# Patient Record
Sex: FEMALE | Race: OTHER | Employment: OTHER | URBAN - NONMETROPOLITAN AREA
[De-identification: names, ages, dates, MRNs, and addresses within clinical notes are randomized per-mention and may not be internally consistent; named-entity substitution may affect disease eponyms.]

---

## 2017-09-15 ENCOUNTER — HOSPITAL ENCOUNTER (EMERGENCY)
Age: 67
Discharge: HOME | End: 2017-09-15
Payer: MEDICARE

## 2017-09-15 VITALS
BODY MASS INDEX: 24.54 KG/M2 | DIASTOLIC BLOOD PRESSURE: 72 MMHG | WEIGHT: 125 LBS | RESPIRATION RATE: 14 BRPM | OXYGEN SATURATION: 95 % | HEIGHT: 60 IN | TEMPERATURE: 97.9 F | SYSTOLIC BLOOD PRESSURE: 149 MMHG | HEART RATE: 100 BPM

## 2017-09-15 DIAGNOSIS — R45.4 ANGER REACTION: Primary | ICD-10-CM

## 2017-09-15 PROCEDURE — 99285 EMERGENCY DEPT VISIT HI MDM: CPT

## 2017-09-15 RX ORDER — PREGABALIN 150 MG/1
300 CAPSULE ORAL EVERY MORNING
COMMUNITY

## 2017-09-15 ASSESSMENT — ENCOUNTER SYMPTOMS
VOMITING: 0
WHEEZING: 0
SORE THROAT: 0
SHORTNESS OF BREATH: 0
ABDOMINAL PAIN: 0
DIARRHEA: 0
BACK PAIN: 0
EYE PAIN: 0
EYE ITCHING: 0
RHINORRHEA: 0
NAUSEA: 0
COUGH: 0

## 2017-09-15 ASSESSMENT — PAIN SCALES - GENERAL: PAINLEVEL_OUTOF10: 5

## 2018-04-04 ENCOUNTER — HOSPITAL ENCOUNTER (OUTPATIENT)
Age: 68
Setting detail: SPECIMEN
Discharge: HOME | End: 2018-04-04
Payer: MEDICARE

## 2018-04-04 LAB
ABSOLUTE EOS #: 0.17 K/UL (ref 0–0.44)
ABSOLUTE IMMATURE GRANULOCYTE: <0.03 K/UL (ref 0–0.3)
ABSOLUTE LYMPH #: 2.12 K/UL (ref 1.1–3.7)
ABSOLUTE MONO #: 0.35 K/UL (ref 0.1–1.2)
ALBUMIN SERPL-MCNC: 3.7 G/DL (ref 3.5–5.2)
ALBUMIN/GLOBULIN RATIO: 1.3 (ref 1–2.5)
ALP BLD-CCNC: 70 U/L (ref 35–104)
ALT SERPL-CCNC: 17 U/L (ref 5–33)
ANION GAP SERPL CALCULATED.3IONS-SCNC: 8 MMOL/L (ref 9–17)
AST SERPL-CCNC: 19 U/L
BASOPHILS # BLD: 1 % (ref 0–2)
BASOPHILS ABSOLUTE: 0.04 K/UL (ref 0–0.2)
BILIRUB SERPL-MCNC: 0.19 MG/DL (ref 0.3–1.2)
BUN BLDV-MCNC: 49 MG/DL (ref 8–23)
BUN/CREAT BLD: ABNORMAL (ref 9–20)
CALCIUM SERPL-MCNC: 8.6 MG/DL (ref 8.6–10.4)
CHLORIDE BLD-SCNC: 113 MMOL/L (ref 98–107)
CHOLESTEROL/HDL RATIO: 3.2
CHOLESTEROL: 156 MG/DL
CO2: 23 MMOL/L (ref 20–31)
CREAT SERPL-MCNC: 1.17 MG/DL (ref 0.5–0.9)
DIFFERENTIAL TYPE: ABNORMAL
EOSINOPHILS RELATIVE PERCENT: 3 % (ref 1–4)
ESTIMATED AVERAGE GLUCOSE: 140 MG/DL
GFR AFRICAN AMERICAN: 55 ML/MIN
GFR NON-AFRICAN AMERICAN: 46 ML/MIN
GFR SERPL CREATININE-BSD FRML MDRD: ABNORMAL ML/MIN/{1.73_M2}
GFR SERPL CREATININE-BSD FRML MDRD: ABNORMAL ML/MIN/{1.73_M2}
GLUCOSE BLD-MCNC: 118 MG/DL (ref 70–99)
HBA1C MFR BLD: 6.5 % (ref 4–6)
HCT VFR BLD CALC: 29.9 % (ref 36.3–47.1)
HDLC SERPL-MCNC: 49 MG/DL
HEMOGLOBIN: 9.3 G/DL (ref 11.9–15.1)
IMMATURE GRANULOCYTES: 0 %
LDL CHOLESTEROL: 79 MG/DL (ref 0–130)
LYMPHOCYTES # BLD: 33 % (ref 24–43)
MCH RBC QN AUTO: 29.8 PG (ref 25.2–33.5)
MCHC RBC AUTO-ENTMCNC: 31.1 G/DL (ref 28.4–34.8)
MCV RBC AUTO: 95.8 FL (ref 82.6–102.9)
MONOCYTES # BLD: 5 % (ref 3–12)
NRBC AUTOMATED: 0 PER 100 WBC
PDW BLD-RTO: 14.6 % (ref 11.8–14.4)
PLATELET # BLD: 227 K/UL (ref 138–453)
PLATELET ESTIMATE: ABNORMAL
PMV BLD AUTO: 11.2 FL (ref 8.1–13.5)
POTASSIUM SERPL-SCNC: 5.4 MMOL/L (ref 3.7–5.3)
RBC # BLD: 3.12 M/UL (ref 3.95–5.11)
RBC # BLD: ABNORMAL 10*6/UL
SEG NEUTROPHILS: 58 % (ref 36–65)
SEGMENTED NEUTROPHILS ABSOLUTE COUNT: 3.75 K/UL (ref 1.5–8.1)
SODIUM BLD-SCNC: 144 MMOL/L (ref 135–144)
TOTAL PROTEIN: 6.6 G/DL (ref 6.4–8.3)
TRIGL SERPL-MCNC: 141 MG/DL
TSH SERPL DL<=0.05 MIU/L-ACNC: 2.24 MIU/L (ref 0.3–5)
VLDLC SERPL CALC-MCNC: NORMAL MG/DL (ref 1–30)
WBC # BLD: 6.4 K/UL (ref 3.5–11.3)
WBC # BLD: ABNORMAL 10*3/UL

## 2019-10-23 ENCOUNTER — HOSPITAL ENCOUNTER (INPATIENT)
Dept: HOSPITAL 90 - EDH | Age: 69
LOS: 6 days | Discharge: NURSING, REHAB OR LONG-TERM CARE | DRG: 871 | End: 2019-10-29
Attending: INTERNAL MEDICINE | Admitting: INTERNAL MEDICINE
Payer: MEDICARE

## 2019-10-23 VITALS — HEIGHT: 62 IN | BODY MASS INDEX: 20.91 KG/M2 | WEIGHT: 113.6 LBS

## 2019-10-23 VITALS — DIASTOLIC BLOOD PRESSURE: 43 MMHG | SYSTOLIC BLOOD PRESSURE: 96 MMHG

## 2019-10-23 DIAGNOSIS — Z79.899: ICD-10-CM

## 2019-10-23 DIAGNOSIS — E86.1: ICD-10-CM

## 2019-10-23 DIAGNOSIS — N17.9: ICD-10-CM

## 2019-10-23 DIAGNOSIS — N18.6: ICD-10-CM

## 2019-10-23 DIAGNOSIS — I12.0: ICD-10-CM

## 2019-10-23 DIAGNOSIS — Z91.81: ICD-10-CM

## 2019-10-23 DIAGNOSIS — M62.82: ICD-10-CM

## 2019-10-23 DIAGNOSIS — E11.22: ICD-10-CM

## 2019-10-23 DIAGNOSIS — J44.9: ICD-10-CM

## 2019-10-23 DIAGNOSIS — E11.10: ICD-10-CM

## 2019-10-23 DIAGNOSIS — G93.41: ICD-10-CM

## 2019-10-23 DIAGNOSIS — R65.20: ICD-10-CM

## 2019-10-23 DIAGNOSIS — Z96.41: ICD-10-CM

## 2019-10-23 DIAGNOSIS — E87.1: ICD-10-CM

## 2019-10-23 DIAGNOSIS — N39.0: ICD-10-CM

## 2019-10-23 DIAGNOSIS — B02.9: ICD-10-CM

## 2019-10-23 DIAGNOSIS — I95.9: ICD-10-CM

## 2019-10-23 DIAGNOSIS — E87.5: ICD-10-CM

## 2019-10-23 DIAGNOSIS — A41.9: Primary | ICD-10-CM

## 2019-10-23 DIAGNOSIS — G93.49: ICD-10-CM

## 2019-10-23 DIAGNOSIS — Z79.4: ICD-10-CM

## 2019-10-23 LAB
ALBUMIN SERPL-MCNC: 3.1 G/DL (ref 3.5–5)
ALT SERPL-CCNC: 9 U/L (ref 12–78)
APPEARANCE UR: (no result)
APTT PPP: 23.6 SEC (ref 26.3–35.5)
AST SERPL-CCNC: 13 U/L (ref 10–37)
BASE EXCESS BLDA CALC-SCNC: -13.7 MMOL/L (ref -2–3)
BASE EXCESS BLDA CALC-SCNC: -16.3 MMOL/L (ref -2–3)
BASOPHILS NFR BLD AUTO: 0.3 % (ref 0–5)
BILIRUB SERPL-MCNC: 0.6 MG/DL (ref 0.2–1)
BILIRUB UR QL STRIP: NEGATIVE
BUN SERPL-MCNC: 70 MG/DL (ref 7–18)
BUN SERPL-MCNC: 77 MG/DL (ref 7–18)
CHLORIDE SERPL-SCNC: 84 MMOL/L (ref 101–111)
CHLORIDE SERPL-SCNC: 97 MMOL/L (ref 101–111)
CK SERPL-CCNC: 182 U/L (ref 21–232)
CO2 SERPL-SCNC: 11 MMOL/L (ref 21–32)
CO2 SERPL-SCNC: 13 MMOL/L (ref 21–32)
COLOR UR: YELLOW
CREAT SERPL-MCNC: 3.4 MG/DL (ref 0.5–1.5)
CREAT SERPL-MCNC: 4 MG/DL (ref 0.5–1.5)
DEPRECATED SQUAMOUS URNS QL MICRO: (no result) /HPF (ref 0–2)
EOSINOPHIL NFR BLD AUTO: 0 % (ref 0–8)
ERYTHROCYTE [DISTWIDTH] IN BLOOD BY AUTOMATED COUNT: 13.5 % (ref 11–15.5)
GFR SERPL CREATININE-BSD FRML MDRD: 12 ML/MIN (ref 60–?)
GFR SERPL CREATININE-BSD FRML MDRD: 14 ML/MIN (ref 60–?)
GLUCOSE SERPL-MCNC: 446 MG/DL (ref 70–105)
GLUCOSE SERPL-MCNC: 826 MG/DL (ref 70–105)
GLUCOSE UR STRIP-MCNC: >=1000 MG/DL
HCO3 BLDA-SCNC: 10.1 MMOL/L (ref 21–28)
HCO3 BLDA-SCNC: 8.5 MMOL/L (ref 21–28)
HCT VFR BLD AUTO: 34.2 % (ref 36–48)
HGB UR QL STRIP: (no result)
INR PPP: 0.93 (ref 0.85–1.15)
KETONES UR STRIP-MCNC: 40 MG/DL
LEUKOCYTE ESTERASE UR QL STRIP: (no result)
LYMPHOCYTES NFR SPEC AUTO: 4.5 % (ref 21–51)
MAGNESIUM SERPL-MCNC: 2.7 MG/DL (ref 1.8–2.4)
MCH RBC QN AUTO: 30.9 PG (ref 27–33)
MCHC RBC AUTO-ENTMCNC: 32.5 G/DL (ref 32–36)
MCV RBC AUTO: 95.2 FL (ref 79–99)
MONOCYTES NFR BLD AUTO: 4.7 % (ref 3–13)
MYOGLOBIN SERPL-MCNC: 332 NG/ML (ref 10–92)
NEUTROPHILS NFR BLD AUTO: 90.5 % (ref 40–77)
NITRITE UR QL STRIP: NEGATIVE
NRBC BLD MANUAL-RTO: 0 % (ref 0–0.19)
PCO2 BLDA: 20 MMHG (ref 32–45)
PCO2 BLDA: 21 MMHG (ref 32–45)
PH UR STRIP: 5 [PH] (ref 5–8)
PLATELET # BLD AUTO: 274 K/UL (ref 130–400)
POTASSIUM SERPL-SCNC: 4.4 MMOL/L (ref 3.5–5.1)
POTASSIUM SERPL-SCNC: 5.8 MMOL/L (ref 3.5–5.1)
PROT SERPL-MCNC: 7.8 G/DL (ref 6–8.3)
PROT UR QL STRIP: 30 MG/DL
PROTHROMBIN TIME: 9.8 SEC (ref 9.6–11.6)
RBC # BLD AUTO: 3.59 MIL/UL (ref 4–5.5)
RBC #/AREA URNS HPF: (no result) /HPF (ref 0–1)
SAO2 % BLDA: 94.4 % (ref 95–99)
SAO2 % BLDA: 97.9 % (ref 95–99)
SODIUM SERPL-SCNC: 123 MMOL/L (ref 136–145)
SODIUM SERPL-SCNC: 133 MMOL/L (ref 136–145)
SP GR UR STRIP: 1.02 (ref 1–1.03)
TROPONIN I SERPL-MCNC: < 0.04 NG/ML (ref 0–0.06)
UROBILINOGEN UR STRIP-MCNC: 0.2 MG/DL (ref 0.2–1)
WBC # BLD AUTO: 15.8 K/UL (ref 4.8–10.8)
WBC #/AREA URNS HPF: (no result) /HPF (ref 0–1)

## 2019-10-23 PROCEDURE — 87040 BLOOD CULTURE FOR BACTERIA: CPT

## 2019-10-23 PROCEDURE — 83874 ASSAY OF MYOGLOBIN: CPT

## 2019-10-23 PROCEDURE — 84100 ASSAY OF PHOSPHORUS: CPT

## 2019-10-23 PROCEDURE — 97039 UNLISTED MODALITY: CPT

## 2019-10-23 PROCEDURE — 82948 REAGENT STRIP/BLOOD GLUCOSE: CPT

## 2019-10-23 PROCEDURE — 84132 ASSAY OF SERUM POTASSIUM: CPT

## 2019-10-23 PROCEDURE — 83605 ASSAY OF LACTIC ACID: CPT

## 2019-10-23 PROCEDURE — 82803 BLOOD GASES ANY COMBINATION: CPT

## 2019-10-23 PROCEDURE — 82010 KETONE BODYS QUAN: CPT

## 2019-10-23 PROCEDURE — 87088 URINE BACTERIA CULTURE: CPT

## 2019-10-23 PROCEDURE — 36415 COLL VENOUS BLD VENIPUNCTURE: CPT

## 2019-10-23 PROCEDURE — 93005 ELECTROCARDIOGRAM TRACING: CPT

## 2019-10-23 PROCEDURE — 85610 PROTHROMBIN TIME: CPT

## 2019-10-23 PROCEDURE — 83036 HEMOGLOBIN GLYCOSYLATED A1C: CPT

## 2019-10-23 PROCEDURE — 81001 URINALYSIS AUTO W/SCOPE: CPT

## 2019-10-23 PROCEDURE — 85027 COMPLETE CBC AUTOMATED: CPT

## 2019-10-23 PROCEDURE — 84145 PROCALCITONIN (PCT): CPT

## 2019-10-23 PROCEDURE — 85025 COMPLETE CBC W/AUTO DIFF WBC: CPT

## 2019-10-23 PROCEDURE — 99291 CRITICAL CARE FIRST HOUR: CPT

## 2019-10-23 PROCEDURE — 36600 WITHDRAWAL OF ARTERIAL BLOOD: CPT

## 2019-10-23 PROCEDURE — 85730 THROMBOPLASTIN TIME PARTIAL: CPT

## 2019-10-23 PROCEDURE — 80053 COMPREHEN METABOLIC PANEL: CPT

## 2019-10-23 PROCEDURE — 71045 X-RAY EXAM CHEST 1 VIEW: CPT

## 2019-10-23 PROCEDURE — 83735 ASSAY OF MAGNESIUM: CPT

## 2019-10-23 PROCEDURE — 82550 ASSAY OF CK (CPK): CPT

## 2019-10-23 PROCEDURE — 80048 BASIC METABOLIC PNL TOTAL CA: CPT

## 2019-10-23 PROCEDURE — 84443 ASSAY THYROID STIM HORMONE: CPT

## 2019-10-23 PROCEDURE — 84484 ASSAY OF TROPONIN QUANT: CPT

## 2019-10-23 RX ADMIN — SODIUM CHLORIDE SCH GM: 9 INJECTION, SOLUTION INTRAVENOUS at 19:45

## 2019-10-23 RX ADMIN — SODIUM CHLORIDE SCH MLS/HR: 0.9 INJECTION, SOLUTION INTRAVENOUS at 19:06

## 2019-10-24 VITALS — DIASTOLIC BLOOD PRESSURE: 52 MMHG | SYSTOLIC BLOOD PRESSURE: 112 MMHG

## 2019-10-24 VITALS — DIASTOLIC BLOOD PRESSURE: 41 MMHG | SYSTOLIC BLOOD PRESSURE: 116 MMHG

## 2019-10-24 VITALS — DIASTOLIC BLOOD PRESSURE: 50 MMHG | SYSTOLIC BLOOD PRESSURE: 113 MMHG

## 2019-10-24 VITALS — DIASTOLIC BLOOD PRESSURE: 44 MMHG | SYSTOLIC BLOOD PRESSURE: 87 MMHG

## 2019-10-24 VITALS — SYSTOLIC BLOOD PRESSURE: 103 MMHG | DIASTOLIC BLOOD PRESSURE: 51 MMHG

## 2019-10-24 VITALS — SYSTOLIC BLOOD PRESSURE: 113 MMHG | DIASTOLIC BLOOD PRESSURE: 52 MMHG

## 2019-10-24 VITALS — SYSTOLIC BLOOD PRESSURE: 108 MMHG | DIASTOLIC BLOOD PRESSURE: 61 MMHG

## 2019-10-24 VITALS — SYSTOLIC BLOOD PRESSURE: 97 MMHG | DIASTOLIC BLOOD PRESSURE: 52 MMHG

## 2019-10-24 VITALS — SYSTOLIC BLOOD PRESSURE: 110 MMHG | DIASTOLIC BLOOD PRESSURE: 50 MMHG

## 2019-10-24 VITALS — DIASTOLIC BLOOD PRESSURE: 55 MMHG | SYSTOLIC BLOOD PRESSURE: 124 MMHG

## 2019-10-24 VITALS — DIASTOLIC BLOOD PRESSURE: 51 MMHG | SYSTOLIC BLOOD PRESSURE: 102 MMHG

## 2019-10-24 VITALS — DIASTOLIC BLOOD PRESSURE: 55 MMHG | SYSTOLIC BLOOD PRESSURE: 111 MMHG

## 2019-10-24 VITALS — DIASTOLIC BLOOD PRESSURE: 52 MMHG | SYSTOLIC BLOOD PRESSURE: 109 MMHG

## 2019-10-24 VITALS — DIASTOLIC BLOOD PRESSURE: 50 MMHG | SYSTOLIC BLOOD PRESSURE: 104 MMHG

## 2019-10-24 VITALS — SYSTOLIC BLOOD PRESSURE: 120 MMHG | DIASTOLIC BLOOD PRESSURE: 56 MMHG

## 2019-10-24 VITALS — SYSTOLIC BLOOD PRESSURE: 110 MMHG | DIASTOLIC BLOOD PRESSURE: 48 MMHG

## 2019-10-24 VITALS — SYSTOLIC BLOOD PRESSURE: 95 MMHG | DIASTOLIC BLOOD PRESSURE: 41 MMHG

## 2019-10-24 VITALS — SYSTOLIC BLOOD PRESSURE: 104 MMHG | DIASTOLIC BLOOD PRESSURE: 51 MMHG

## 2019-10-24 VITALS — SYSTOLIC BLOOD PRESSURE: 121 MMHG | DIASTOLIC BLOOD PRESSURE: 57 MMHG

## 2019-10-24 VITALS — SYSTOLIC BLOOD PRESSURE: 112 MMHG | DIASTOLIC BLOOD PRESSURE: 50 MMHG

## 2019-10-24 VITALS — SYSTOLIC BLOOD PRESSURE: 113 MMHG | DIASTOLIC BLOOD PRESSURE: 42 MMHG

## 2019-10-24 VITALS — DIASTOLIC BLOOD PRESSURE: 58 MMHG | SYSTOLIC BLOOD PRESSURE: 98 MMHG

## 2019-10-24 VITALS — SYSTOLIC BLOOD PRESSURE: 103 MMHG | DIASTOLIC BLOOD PRESSURE: 53 MMHG

## 2019-10-24 VITALS — SYSTOLIC BLOOD PRESSURE: 89 MMHG | DIASTOLIC BLOOD PRESSURE: 49 MMHG

## 2019-10-24 VITALS — DIASTOLIC BLOOD PRESSURE: 46 MMHG | SYSTOLIC BLOOD PRESSURE: 119 MMHG

## 2019-10-24 VITALS — SYSTOLIC BLOOD PRESSURE: 108 MMHG | DIASTOLIC BLOOD PRESSURE: 49 MMHG

## 2019-10-24 VITALS — SYSTOLIC BLOOD PRESSURE: 108 MMHG | DIASTOLIC BLOOD PRESSURE: 53 MMHG

## 2019-10-24 VITALS — SYSTOLIC BLOOD PRESSURE: 94 MMHG | DIASTOLIC BLOOD PRESSURE: 48 MMHG

## 2019-10-24 VITALS — SYSTOLIC BLOOD PRESSURE: 106 MMHG | DIASTOLIC BLOOD PRESSURE: 56 MMHG

## 2019-10-24 VITALS — SYSTOLIC BLOOD PRESSURE: 100 MMHG | DIASTOLIC BLOOD PRESSURE: 52 MMHG

## 2019-10-24 VITALS — SYSTOLIC BLOOD PRESSURE: 103 MMHG | DIASTOLIC BLOOD PRESSURE: 55 MMHG

## 2019-10-24 VITALS — DIASTOLIC BLOOD PRESSURE: 56 MMHG | SYSTOLIC BLOOD PRESSURE: 97 MMHG

## 2019-10-24 VITALS — DIASTOLIC BLOOD PRESSURE: 41 MMHG | SYSTOLIC BLOOD PRESSURE: 107 MMHG

## 2019-10-24 VITALS — SYSTOLIC BLOOD PRESSURE: 122 MMHG | DIASTOLIC BLOOD PRESSURE: 51 MMHG

## 2019-10-24 VITALS — SYSTOLIC BLOOD PRESSURE: 114 MMHG | DIASTOLIC BLOOD PRESSURE: 58 MMHG

## 2019-10-24 VITALS — SYSTOLIC BLOOD PRESSURE: 95 MMHG | DIASTOLIC BLOOD PRESSURE: 46 MMHG

## 2019-10-24 VITALS — DIASTOLIC BLOOD PRESSURE: 49 MMHG | SYSTOLIC BLOOD PRESSURE: 91 MMHG

## 2019-10-24 VITALS — SYSTOLIC BLOOD PRESSURE: 117 MMHG | DIASTOLIC BLOOD PRESSURE: 50 MMHG

## 2019-10-24 VITALS — SYSTOLIC BLOOD PRESSURE: 119 MMHG | DIASTOLIC BLOOD PRESSURE: 53 MMHG

## 2019-10-24 VITALS — SYSTOLIC BLOOD PRESSURE: 98 MMHG | DIASTOLIC BLOOD PRESSURE: 54 MMHG

## 2019-10-24 VITALS — SYSTOLIC BLOOD PRESSURE: 96 MMHG | DIASTOLIC BLOOD PRESSURE: 56 MMHG

## 2019-10-24 VITALS — SYSTOLIC BLOOD PRESSURE: 90 MMHG | DIASTOLIC BLOOD PRESSURE: 41 MMHG

## 2019-10-24 VITALS — DIASTOLIC BLOOD PRESSURE: 50 MMHG | SYSTOLIC BLOOD PRESSURE: 108 MMHG

## 2019-10-24 VITALS — SYSTOLIC BLOOD PRESSURE: 85 MMHG | DIASTOLIC BLOOD PRESSURE: 49 MMHG

## 2019-10-24 VITALS — SYSTOLIC BLOOD PRESSURE: 94 MMHG | DIASTOLIC BLOOD PRESSURE: 49 MMHG

## 2019-10-24 VITALS — DIASTOLIC BLOOD PRESSURE: 45 MMHG | SYSTOLIC BLOOD PRESSURE: 105 MMHG

## 2019-10-24 VITALS — DIASTOLIC BLOOD PRESSURE: 51 MMHG | SYSTOLIC BLOOD PRESSURE: 98 MMHG

## 2019-10-24 VITALS — DIASTOLIC BLOOD PRESSURE: 51 MMHG | SYSTOLIC BLOOD PRESSURE: 100 MMHG

## 2019-10-24 VITALS — SYSTOLIC BLOOD PRESSURE: 106 MMHG | DIASTOLIC BLOOD PRESSURE: 54 MMHG

## 2019-10-24 VITALS — SYSTOLIC BLOOD PRESSURE: 113 MMHG | DIASTOLIC BLOOD PRESSURE: 50 MMHG

## 2019-10-24 VITALS — SYSTOLIC BLOOD PRESSURE: 105 MMHG | DIASTOLIC BLOOD PRESSURE: 53 MMHG

## 2019-10-24 VITALS — DIASTOLIC BLOOD PRESSURE: 56 MMHG | SYSTOLIC BLOOD PRESSURE: 100 MMHG

## 2019-10-24 VITALS — SYSTOLIC BLOOD PRESSURE: 107 MMHG | DIASTOLIC BLOOD PRESSURE: 53 MMHG

## 2019-10-24 VITALS — DIASTOLIC BLOOD PRESSURE: 57 MMHG | SYSTOLIC BLOOD PRESSURE: 114 MMHG

## 2019-10-24 VITALS — SYSTOLIC BLOOD PRESSURE: 104 MMHG | DIASTOLIC BLOOD PRESSURE: 48 MMHG

## 2019-10-24 LAB
BASE EXCESS BLDA CALC-SCNC: -6 MMOL/L (ref -2–3)
BASE EXCESS BLDA CALC-SCNC: -9.2 MMOL/L (ref -2–3)
BUN SERPL-MCNC: 56 MG/DL (ref 7–18)
BUN SERPL-MCNC: 59 MG/DL (ref 7–18)
CHLORIDE SERPL-SCNC: 103 MMOL/L (ref 101–111)
CHLORIDE SERPL-SCNC: 107 MMOL/L (ref 101–111)
CO2 SERPL-SCNC: 21 MMOL/L (ref 21–32)
CO2 SERPL-SCNC: 22 MMOL/L (ref 21–32)
CREAT SERPL-MCNC: 2.5 MG/DL (ref 0.5–1.5)
CREAT SERPL-MCNC: 2.8 MG/DL (ref 0.5–1.5)
GFR SERPL CREATININE-BSD FRML MDRD: 18 ML/MIN (ref 60–?)
GFR SERPL CREATININE-BSD FRML MDRD: 20 ML/MIN (ref 60–?)
GLUCOSE SERPL-MCNC: 113 MG/DL (ref 70–105)
GLUCOSE SERPL-MCNC: 158 MG/DL (ref 70–105)
HCO3 BLDA-SCNC: 15.8 MMOL/L (ref 21–28)
HCO3 BLDA-SCNC: 18.1 MMOL/L (ref 21–28)
MAGNESIUM SERPL-MCNC: 2.4 MG/DL (ref 1.8–2.4)
MAGNESIUM SERPL-MCNC: 2.5 MG/DL (ref 1.8–2.4)
PCO2 BLDA: 32 MMHG (ref 32–45)
PCO2 BLDA: 33 MMHG (ref 32–45)
POTASSIUM SERPL-SCNC: 3.4 MMOL/L (ref 3.5–5.1)
POTASSIUM SERPL-SCNC: 3.6 MMOL/L (ref 3.5–5.1)
SAO2 % BLDA: 96.1 % (ref 95–99)
SAO2 % BLDA: 97.5 % (ref 95–99)
SODIUM SERPL-SCNC: 136 MMOL/L (ref 136–145)
SODIUM SERPL-SCNC: 138 MMOL/L (ref 136–145)

## 2019-10-24 RX ADMIN — DEXTROSE AND SODIUM CHLORIDE PRN MLS/HR: 5; .45 INJECTION, SOLUTION INTRAVENOUS at 01:42

## 2019-10-24 RX ADMIN — FLUCONAZOLE IN SODIUM CHLORIDE SCH MLS/HR: 2 INJECTION, SOLUTION INTRAVENOUS at 07:58

## 2019-10-24 RX ADMIN — ENOXAPARIN SODIUM SCH MG: 30 INJECTION SUBCUTANEOUS at 20:48

## 2019-10-24 RX ADMIN — SODIUM CHLORIDE SCH MLS/HR: 0.9 INJECTION, SOLUTION INTRAVENOUS at 20:47

## 2019-10-24 RX ADMIN — SODIUM CHLORIDE SCH GM: 9 INJECTION, SOLUTION INTRAVENOUS at 07:58

## 2019-10-24 RX ADMIN — INSULIN GLARGINE SCH UNITS: 100 INJECTION, SOLUTION SUBCUTANEOUS at 20:48

## 2019-10-24 RX ADMIN — SODIUM CHLORIDE SCH MLS/HR: 0.9 INJECTION, SOLUTION INTRAVENOUS at 12:44

## 2019-10-24 RX ADMIN — SODIUM CHLORIDE SCH MLS/HR: 0.9 INJECTION, SOLUTION INTRAVENOUS at 07:59

## 2019-10-24 RX ADMIN — FAMOTIDINE SCH MG: 10 INJECTION INTRAVENOUS at 07:58

## 2019-10-24 RX ADMIN — INSULIN LISPRO SCH UNIT: 100 INJECTION, SOLUTION INTRAVENOUS; SUBCUTANEOUS at 17:28

## 2019-10-24 RX ADMIN — DEXTROSE AND SODIUM CHLORIDE PRN MLS/HR: 5; .45 INJECTION, SOLUTION INTRAVENOUS at 11:00

## 2019-10-24 RX ADMIN — SODIUM CHLORIDE SCH MLS/HR: 0.9 INJECTION, SOLUTION INTRAVENOUS at 07:45

## 2019-10-24 RX ADMIN — SODIUM CHLORIDE SCH GM: 9 INJECTION, SOLUTION INTRAVENOUS at 20:47

## 2019-10-24 RX ADMIN — INSULIN LISPRO SCH UNIT: 100 INJECTION, SOLUTION INTRAVENOUS; SUBCUTANEOUS at 12:55

## 2019-10-24 RX ADMIN — SODIUM CHLORIDE SCH MLS/HR: 0.9 INJECTION, SOLUTION INTRAVENOUS at 14:39

## 2019-10-24 NOTE — NUR
DC PLAN

AS PER PATIENT, LIVES WITH DAUGHTER AND GRANDCHILD AND IS INDEPENDENT. NO PROVIDER SERVICES, 
HAS WALKER AND CANE. AS PER PATIENT, FEELS SAFE TO RETURN HOME.

-------------------------------------------------------------------------------

Addendum: 10/24/19 at 1559 by VIRAJ HICKMAN

-------------------------------------------------------------------------------

Amended: Links added.

## 2019-10-24 NOTE — NUR
DR. GARCIA IN ROOM ASSESSING/SPEAKING WITH PT. RE:PLAN OF CARE. QUESTIONS ANSWERED BY 
DR. GARCIA, PT. VERBALIZED UNDERSTANDING.

## 2019-10-25 VITALS — DIASTOLIC BLOOD PRESSURE: 62 MMHG | SYSTOLIC BLOOD PRESSURE: 137 MMHG

## 2019-10-25 VITALS — DIASTOLIC BLOOD PRESSURE: 59 MMHG | SYSTOLIC BLOOD PRESSURE: 126 MMHG

## 2019-10-25 VITALS — DIASTOLIC BLOOD PRESSURE: 52 MMHG | SYSTOLIC BLOOD PRESSURE: 127 MMHG

## 2019-10-25 VITALS — DIASTOLIC BLOOD PRESSURE: 62 MMHG | SYSTOLIC BLOOD PRESSURE: 130 MMHG

## 2019-10-25 VITALS — SYSTOLIC BLOOD PRESSURE: 150 MMHG | DIASTOLIC BLOOD PRESSURE: 75 MMHG

## 2019-10-25 VITALS — DIASTOLIC BLOOD PRESSURE: 65 MMHG | SYSTOLIC BLOOD PRESSURE: 149 MMHG

## 2019-10-25 VITALS — SYSTOLIC BLOOD PRESSURE: 134 MMHG | DIASTOLIC BLOOD PRESSURE: 58 MMHG

## 2019-10-25 VITALS — SYSTOLIC BLOOD PRESSURE: 152 MMHG | DIASTOLIC BLOOD PRESSURE: 73 MMHG

## 2019-10-25 VITALS — SYSTOLIC BLOOD PRESSURE: 145 MMHG | DIASTOLIC BLOOD PRESSURE: 74 MMHG

## 2019-10-25 LAB
BUN SERPL-MCNC: 36 MG/DL (ref 7–18)
CHLORIDE SERPL-SCNC: 109 MMOL/L (ref 101–111)
CK SERPL-CCNC: 36 U/L (ref 21–232)
CO2 SERPL-SCNC: 18 MMOL/L (ref 21–32)
CREAT SERPL-MCNC: 1.6 MG/DL (ref 0.5–1.5)
ERYTHROCYTE [DISTWIDTH] IN BLOOD BY AUTOMATED COUNT: 13.4 % (ref 11–15.5)
GFR SERPL CREATININE-BSD FRML MDRD: 34 ML/MIN (ref 60–?)
GLUCOSE SERPL-MCNC: 204 MG/DL (ref 70–105)
HBA1C MFR BLD: 12.7 % (ref 4–6)
HCT VFR BLD AUTO: 26.6 % (ref 36–48)
MAGNESIUM SERPL-MCNC: 2.1 MG/DL (ref 1.8–2.4)
MCH RBC QN AUTO: 31.3 PG (ref 27–33)
MCHC RBC AUTO-ENTMCNC: 33.9 G/DL (ref 32–36)
MCV RBC AUTO: 92.4 FL (ref 79–99)
MYOGLOBIN SERPL-MCNC: 72 NG/ML (ref 10–92)
NRBC BLD MANUAL-RTO: 0 % (ref 0–0.19)
PHOSPHATE SERPL-MCNC: 2.8 MG/DL (ref 2.5–4.9)
PLATELET # BLD AUTO: 199 K/UL (ref 130–400)
POTASSIUM SERPL-SCNC: 3.5 MMOL/L (ref 3.5–5.1)
RBC # BLD AUTO: 2.88 MIL/UL (ref 4–5.5)
SODIUM SERPL-SCNC: 139 MMOL/L (ref 136–145)
TSH SERPL DL<=0.05 MIU/L-ACNC: 0.71 UIU/ML (ref 0.36–3.74)
WBC # BLD AUTO: 8.3 K/UL (ref 4.8–10.8)

## 2019-10-25 RX ADMIN — INSULIN GLARGINE SCH UNITS: 100 INJECTION, SOLUTION SUBCUTANEOUS at 21:20

## 2019-10-25 RX ADMIN — NIFEDIPINE SCH MG: 30 TABLET, FILM COATED, EXTENDED RELEASE ORAL at 11:31

## 2019-10-25 RX ADMIN — Medication SCH MG: at 21:19

## 2019-10-25 RX ADMIN — Medication SCH MG: at 22:01

## 2019-10-25 RX ADMIN — SODIUM CHLORIDE SCH MLS/HR: 0.9 INJECTION, SOLUTION INTRAVENOUS at 11:06

## 2019-10-25 RX ADMIN — FAMOTIDINE SCH MG: 10 INJECTION INTRAVENOUS at 08:35

## 2019-10-25 RX ADMIN — DOCUSATE SODIUM SCH MG: 100 TABLET, FILM COATED ORAL at 21:19

## 2019-10-25 RX ADMIN — SODIUM CHLORIDE SCH MLS/HR: 0.9 INJECTION, SOLUTION INTRAVENOUS at 23:48

## 2019-10-25 RX ADMIN — ENOXAPARIN SODIUM SCH MG: 30 INJECTION SUBCUTANEOUS at 08:34

## 2019-10-25 RX ADMIN — PREGABALIN SCH MG: 75 CAPSULE ORAL at 11:32

## 2019-10-25 RX ADMIN — INSULIN LISPRO SCH UNIT: 100 INJECTION, SOLUTION INTRAVENOUS; SUBCUTANEOUS at 11:30

## 2019-10-25 RX ADMIN — SODIUM CHLORIDE SCH MLS/HR: 0.9 INJECTION, SOLUTION INTRAVENOUS at 06:12

## 2019-10-25 RX ADMIN — INSULIN LISPRO SCH UNIT: 100 INJECTION, SOLUTION INTRAVENOUS; SUBCUTANEOUS at 06:12

## 2019-10-25 RX ADMIN — SODIUM CHLORIDE SCH MLS/HR: 0.9 INJECTION, SOLUTION INTRAVENOUS at 16:07

## 2019-10-25 RX ADMIN — SODIUM CHLORIDE SCH GM: 9 INJECTION, SOLUTION INTRAVENOUS at 16:20

## 2019-10-25 RX ADMIN — INSULIN LISPRO SCH UNIT: 100 INJECTION, SOLUTION INTRAVENOUS; SUBCUTANEOUS at 16:27

## 2019-10-25 RX ADMIN — SODIUM CHLORIDE SCH GM: 9 INJECTION, SOLUTION INTRAVENOUS at 08:33

## 2019-10-25 RX ADMIN — FLUCONAZOLE IN SODIUM CHLORIDE SCH MLS/HR: 2 INJECTION, SOLUTION INTRAVENOUS at 08:34

## 2019-10-25 NOTE — NUR
Report given to Pricila CALI at about 1150. Patient transferred to room 313 from icu room 209 
at 1220. All belonging went with the patient.

## 2019-10-25 NOTE — NUR
Dr. Price at bedside rounding and reviewed plan of care for the day. Ordered lactulose 
1 time dose to be given

## 2019-10-26 VITALS — SYSTOLIC BLOOD PRESSURE: 140 MMHG | DIASTOLIC BLOOD PRESSURE: 71 MMHG

## 2019-10-26 VITALS — SYSTOLIC BLOOD PRESSURE: 137 MMHG | DIASTOLIC BLOOD PRESSURE: 80 MMHG

## 2019-10-26 VITALS — SYSTOLIC BLOOD PRESSURE: 152 MMHG | DIASTOLIC BLOOD PRESSURE: 76 MMHG

## 2019-10-26 VITALS — SYSTOLIC BLOOD PRESSURE: 122 MMHG | DIASTOLIC BLOOD PRESSURE: 50 MMHG

## 2019-10-26 VITALS — DIASTOLIC BLOOD PRESSURE: 85 MMHG | SYSTOLIC BLOOD PRESSURE: 162 MMHG

## 2019-10-26 LAB
BASOPHILS NFR BLD AUTO: 0.7 % (ref 0–5)
BUN SERPL-MCNC: 15 MG/DL (ref 7–18)
CHLORIDE SERPL-SCNC: 106 MMOL/L (ref 101–111)
CO2 SERPL-SCNC: 23 MMOL/L (ref 21–32)
CREAT SERPL-MCNC: 0.9 MG/DL (ref 0.5–1.5)
EOSINOPHIL NFR BLD AUTO: 0.9 % (ref 0–8)
ERYTHROCYTE [DISTWIDTH] IN BLOOD BY AUTOMATED COUNT: 13 % (ref 11–15.5)
GFR SERPL CREATININE-BSD FRML MDRD: 66 ML/MIN (ref 60–?)
GLUCOSE SERPL-MCNC: 155 MG/DL (ref 70–105)
HCT VFR BLD AUTO: 31.7 % (ref 36–48)
LYMPHOCYTES NFR SPEC AUTO: 17.3 % (ref 21–51)
MAGNESIUM SERPL-MCNC: 1.4 MG/DL (ref 1.8–2.4)
MCH RBC QN AUTO: 31.2 PG (ref 27–33)
MCHC RBC AUTO-ENTMCNC: 33.8 G/DL (ref 32–36)
MCV RBC AUTO: 92.1 FL (ref 79–99)
MONOCYTES NFR BLD AUTO: 9.3 % (ref 3–13)
NEUTROPHILS NFR BLD AUTO: 71.8 % (ref 40–77)
NRBC BLD MANUAL-RTO: 0 % (ref 0–0.19)
PHOSPHATE SERPL-MCNC: 2.6 MG/DL (ref 2.5–4.9)
PLATELET # BLD AUTO: 218 K/UL (ref 130–400)
POTASSIUM SERPL-SCNC: 2.8 MMOL/L (ref 3.5–5.1)
RBC # BLD AUTO: 3.45 MIL/UL (ref 4–5.5)
SODIUM SERPL-SCNC: 143 MMOL/L (ref 136–145)
WBC # BLD AUTO: 6.7 K/UL (ref 4.8–10.8)

## 2019-10-26 RX ADMIN — DOCUSATE SODIUM SCH MG: 100 TABLET, FILM COATED ORAL at 19:45

## 2019-10-26 RX ADMIN — POTASSIUM CHLORIDE PRN MEQ: 1500 TABLET, EXTENDED RELEASE ORAL at 21:15

## 2019-10-26 RX ADMIN — INSULIN LISPRO SCH UNIT: 100 INJECTION, SOLUTION INTRAVENOUS; SUBCUTANEOUS at 11:48

## 2019-10-26 RX ADMIN — NIFEDIPINE SCH MG: 30 TABLET, FILM COATED, EXTENDED RELEASE ORAL at 09:17

## 2019-10-26 RX ADMIN — POTASSIUM CHLORIDE PRN MLS/HR: 10 INJECTION, SOLUTION INTRAVENOUS at 13:19

## 2019-10-26 RX ADMIN — PREGABALIN SCH MG: 75 CAPSULE ORAL at 09:17

## 2019-10-26 RX ADMIN — SODIUM CHLORIDE SCH GM: 9 INJECTION, SOLUTION INTRAVENOUS at 17:40

## 2019-10-26 RX ADMIN — INSULIN GLARGINE SCH UNITS: 100 INJECTION, SOLUTION SUBCUTANEOUS at 20:38

## 2019-10-26 RX ADMIN — ENOXAPARIN SODIUM SCH MG: 30 INJECTION SUBCUTANEOUS at 09:18

## 2019-10-26 RX ADMIN — POTASSIUM CHLORIDE PRN MEQ: 1500 TABLET, EXTENDED RELEASE ORAL at 22:46

## 2019-10-26 RX ADMIN — FLUCONAZOLE IN SODIUM CHLORIDE SCH MLS/HR: 2 INJECTION, SOLUTION INTRAVENOUS at 09:18

## 2019-10-26 RX ADMIN — Medication SCH MG: at 11:44

## 2019-10-26 RX ADMIN — FAMOTIDINE SCH MG: 10 INJECTION INTRAVENOUS at 09:17

## 2019-10-26 RX ADMIN — SODIUM CHLORIDE SCH MLS/HR: 0.9 INJECTION, SOLUTION INTRAVENOUS at 07:06

## 2019-10-26 RX ADMIN — Medication SCH MG: at 22:12

## 2019-10-26 RX ADMIN — Medication SCH MG: at 09:17

## 2019-10-26 RX ADMIN — INSULIN LISPRO SCH UNIT: 100 INJECTION, SOLUTION INTRAVENOUS; SUBCUTANEOUS at 17:50

## 2019-10-26 RX ADMIN — POTASSIUM CHLORIDE PRN MLS/HR: 10 INJECTION, SOLUTION INTRAVENOUS at 10:21

## 2019-10-26 RX ADMIN — DOCUSATE SODIUM SCH MG: 100 TABLET, FILM COATED ORAL at 09:17

## 2019-10-26 RX ADMIN — Medication SCH MG: at 06:06

## 2019-10-26 RX ADMIN — Medication SCH MG: at 17:41

## 2019-10-26 RX ADMIN — Medication SCH MG: at 19:44

## 2019-10-26 RX ADMIN — INSULIN LISPRO SCH UNIT: 100 INJECTION, SOLUTION INTRAVENOUS; SUBCUTANEOUS at 06:07

## 2019-10-26 RX ADMIN — Medication SCH MG: at 19:45

## 2019-10-26 RX ADMIN — DONEPEZIL HYDROCHLORIDE SCH MG: 5 TABLET, FILM COATED ORAL at 09:17

## 2019-10-26 RX ADMIN — SODIUM CHLORIDE SCH GM: 9 INJECTION, SOLUTION INTRAVENOUS at 06:06

## 2019-10-27 VITALS — SYSTOLIC BLOOD PRESSURE: 90 MMHG | DIASTOLIC BLOOD PRESSURE: 41 MMHG

## 2019-10-27 VITALS — SYSTOLIC BLOOD PRESSURE: 94 MMHG | DIASTOLIC BLOOD PRESSURE: 40 MMHG

## 2019-10-27 VITALS — SYSTOLIC BLOOD PRESSURE: 93 MMHG | DIASTOLIC BLOOD PRESSURE: 47 MMHG

## 2019-10-27 VITALS — SYSTOLIC BLOOD PRESSURE: 84 MMHG | DIASTOLIC BLOOD PRESSURE: 40 MMHG

## 2019-10-27 VITALS — DIASTOLIC BLOOD PRESSURE: 47 MMHG | SYSTOLIC BLOOD PRESSURE: 91 MMHG

## 2019-10-27 VITALS — SYSTOLIC BLOOD PRESSURE: 100 MMHG | DIASTOLIC BLOOD PRESSURE: 57 MMHG

## 2019-10-27 VITALS — DIASTOLIC BLOOD PRESSURE: 50 MMHG | SYSTOLIC BLOOD PRESSURE: 100 MMHG

## 2019-10-27 VITALS — DIASTOLIC BLOOD PRESSURE: 60 MMHG | SYSTOLIC BLOOD PRESSURE: 123 MMHG

## 2019-10-27 VITALS — DIASTOLIC BLOOD PRESSURE: 65 MMHG | SYSTOLIC BLOOD PRESSURE: 124 MMHG

## 2019-10-27 LAB
BASOPHILS NFR BLD AUTO: 0.4 % (ref 0–5)
BUN SERPL-MCNC: 18 MG/DL (ref 7–18)
CHLORIDE SERPL-SCNC: 110 MMOL/L (ref 101–111)
CO2 SERPL-SCNC: 26 MMOL/L (ref 21–32)
CREAT SERPL-MCNC: 1.2 MG/DL (ref 0.5–1.5)
EOSINOPHIL NFR BLD AUTO: 0.9 % (ref 0–8)
ERYTHROCYTE [DISTWIDTH] IN BLOOD BY AUTOMATED COUNT: 13 % (ref 11–15.5)
GFR SERPL CREATININE-BSD FRML MDRD: 47 ML/MIN (ref 60–?)
GLUCOSE SERPL-MCNC: 87 MG/DL (ref 70–105)
HCT VFR BLD AUTO: 25.2 % (ref 36–48)
LYMPHOCYTES NFR SPEC AUTO: 20.2 % (ref 21–51)
MCH RBC QN AUTO: 31.4 PG (ref 27–33)
MCHC RBC AUTO-ENTMCNC: 34.4 G/DL (ref 32–36)
MCV RBC AUTO: 91.1 FL (ref 79–99)
MONOCYTES NFR BLD AUTO: 9 % (ref 3–13)
NEUTROPHILS NFR BLD AUTO: 69.5 % (ref 40–77)
NRBC BLD MANUAL-RTO: 0 % (ref 0–0.19)
PLATELET # BLD AUTO: 219 K/UL (ref 130–400)
POTASSIUM SERPL-SCNC: 3.5 MMOL/L (ref 3.5–5.1)
RBC # BLD AUTO: 2.76 MIL/UL (ref 4–5.5)
SODIUM SERPL-SCNC: 144 MMOL/L (ref 136–145)
WBC # BLD AUTO: 7.7 K/UL (ref 4.8–10.8)

## 2019-10-27 RX ADMIN — INSULIN LISPRO SCH UNIT: 100 INJECTION, SOLUTION INTRAVENOUS; SUBCUTANEOUS at 06:25

## 2019-10-27 RX ADMIN — DOCUSATE SODIUM SCH MG: 100 TABLET, FILM COATED ORAL at 08:42

## 2019-10-27 RX ADMIN — SODIUM CHLORIDE SCH MLS/HR: 9 INJECTION, SOLUTION INTRAVENOUS at 01:12

## 2019-10-27 RX ADMIN — PREGABALIN SCH MG: 75 CAPSULE ORAL at 08:42

## 2019-10-27 RX ADMIN — SODIUM CHLORIDE SCH MLS/HR: 9 INJECTION, SOLUTION INTRAVENOUS at 00:05

## 2019-10-27 RX ADMIN — INSULIN LISPRO SCH UNIT: 100 INJECTION, SOLUTION INTRAVENOUS; SUBCUTANEOUS at 12:38

## 2019-10-27 RX ADMIN — NIFEDIPINE SCH MG: 30 TABLET, FILM COATED, EXTENDED RELEASE ORAL at 08:42

## 2019-10-27 RX ADMIN — Medication SCH MG: at 21:18

## 2019-10-27 RX ADMIN — ENOXAPARIN SODIUM SCH MG: 30 INJECTION SUBCUTANEOUS at 08:42

## 2019-10-27 RX ADMIN — Medication SCH MG: at 15:00

## 2019-10-27 RX ADMIN — POTASSIUM CHLORIDE PRN MEQ: 1500 TABLET, EXTENDED RELEASE ORAL at 05:34

## 2019-10-27 RX ADMIN — INSULIN GLARGINE SCH UNITS: 100 INJECTION, SOLUTION SUBCUTANEOUS at 21:00

## 2019-10-27 RX ADMIN — DONEPEZIL HYDROCHLORIDE SCH MG: 5 TABLET, FILM COATED ORAL at 08:43

## 2019-10-27 RX ADMIN — Medication SCH MG: at 08:43

## 2019-10-27 RX ADMIN — SODIUM CHLORIDE SCH GM: 9 INJECTION, SOLUTION INTRAVENOUS at 08:41

## 2019-10-27 RX ADMIN — DOCUSATE SODIUM SCH MG: 100 TABLET, FILM COATED ORAL at 21:18

## 2019-10-27 RX ADMIN — POTASSIUM CHLORIDE PRN MEQ: 1500 TABLET, EXTENDED RELEASE ORAL at 21:18

## 2019-10-27 RX ADMIN — Medication SCH MG: at 12:36

## 2019-10-27 RX ADMIN — Medication SCH MG: at 23:36

## 2019-10-27 RX ADMIN — FLUCONAZOLE IN SODIUM CHLORIDE SCH MLS/HR: 2 INJECTION, SOLUTION INTRAVENOUS at 08:40

## 2019-10-27 RX ADMIN — INSULIN LISPRO SCH UNIT: 100 INJECTION, SOLUTION INTRAVENOUS; SUBCUTANEOUS at 17:05

## 2019-10-27 RX ADMIN — Medication SCH MG: at 05:33

## 2019-10-27 RX ADMIN — FAMOTIDINE SCH MG: 10 INJECTION INTRAVENOUS at 09:37

## 2019-10-27 RX ADMIN — SODIUM CHLORIDE SCH GM: 9 INJECTION, SOLUTION INTRAVENOUS at 21:18

## 2019-10-27 RX ADMIN — Medication SCH MG: at 21:17

## 2019-10-27 NOTE — NUR
LOW BP



INFORMED AJ NP ON CALL HOSPITALIST THAT PATIENTS BLOOD PRESSURE WAS 84/40 HR 76. INFORMED 
HIM THAT PATIENT IS ALERT AND STATES SHE FEELS FINE. NP STATED THAT SINCE PATIENT IS 
ASYMPTOMATIC AND SINCE WE HAVE ALREADY GIVEN THE PATIENT NS BOLUS X2, TO CONTINUE TO MONITOR 
PATIENT. NO FURTHER ORDERS RECEIVED AT THIS TIME.

## 2019-10-27 NOTE — NUR
SNF



Met w pt this afternoon to discuss Md recommendations for short term SNF. Pt is in 
agreement. KAREEM/PC consent signed for [facility]. 



Clinical/PASRR faxed to [facility]. 



Spoke to Caroline over the phone regarding new referral. States will come by later today to 
eval pt. 



CM to continue to follow and wait for determination.

## 2019-10-27 NOTE — NUR
FNP ROUNDS 



AJ NP FOR HOSPITALIST IN PATIENTS ROOM ASSESSING PATIENT, PATIENT EASILY AROUSABLE STATED 
SHE FEELS FINE. ORDERS RECEIVED TO GIVE ANOTHER 500CC NS BOLUS WHICH WAS STARTED WHILE NP 
WAS IN ROOM. WILL CONTINUE TO MONITOR PATIENT.

## 2019-10-27 NOTE — NUR
BLOOD PRESSURE



INFORMED JUANA NP FOR HOSPITALIST THAT PATIENTS BP WAS 82/40 AFTER 500CC NS BOLUS WAS GIVEN. HE 
STATED HE WOULD BE DOWN TO ASSESS PATIENT.

## 2019-10-27 NOTE — NUR
Low BP



Spoke with JUANA FNP on call hospitalist, and informed him that patient BP at first was 80/47, 
on re-check was 91/47. Patient asymptomatic states she feels perfectly fine. Orders received 
to give 500cc NS bolus x 1 dose. Will continue to monitor patient.

## 2019-10-28 VITALS — DIASTOLIC BLOOD PRESSURE: 51 MMHG | SYSTOLIC BLOOD PRESSURE: 98 MMHG

## 2019-10-28 VITALS — SYSTOLIC BLOOD PRESSURE: 141 MMHG | DIASTOLIC BLOOD PRESSURE: 70 MMHG

## 2019-10-28 VITALS — DIASTOLIC BLOOD PRESSURE: 69 MMHG | SYSTOLIC BLOOD PRESSURE: 132 MMHG

## 2019-10-28 VITALS — DIASTOLIC BLOOD PRESSURE: 63 MMHG | SYSTOLIC BLOOD PRESSURE: 128 MMHG

## 2019-10-28 VITALS — SYSTOLIC BLOOD PRESSURE: 121 MMHG | DIASTOLIC BLOOD PRESSURE: 58 MMHG

## 2019-10-28 VITALS — SYSTOLIC BLOOD PRESSURE: 100 MMHG | DIASTOLIC BLOOD PRESSURE: 51 MMHG

## 2019-10-28 VITALS — DIASTOLIC BLOOD PRESSURE: 70 MMHG | SYSTOLIC BLOOD PRESSURE: 144 MMHG

## 2019-10-28 LAB
BASOPHILS NFR BLD AUTO: 0.6 % (ref 0–5)
BUN SERPL-MCNC: 16 MG/DL (ref 7–18)
CHLORIDE SERPL-SCNC: 112 MMOL/L (ref 101–111)
CO2 SERPL-SCNC: 22 MMOL/L (ref 21–32)
CREAT SERPL-MCNC: 1 MG/DL (ref 0.5–1.5)
EOSINOPHIL NFR BLD AUTO: 1.4 % (ref 0–8)
ERYTHROCYTE [DISTWIDTH] IN BLOOD BY AUTOMATED COUNT: 13.6 % (ref 11–15.5)
GFR SERPL CREATININE-BSD FRML MDRD: 58 ML/MIN (ref 60–?)
GLUCOSE SERPL-MCNC: 109 MG/DL (ref 70–105)
HCT VFR BLD AUTO: 26.4 % (ref 36–48)
LYMPHOCYTES NFR SPEC AUTO: 22.6 % (ref 21–51)
MCH RBC QN AUTO: 31.4 PG (ref 27–33)
MCHC RBC AUTO-ENTMCNC: 33.7 G/DL (ref 32–36)
MCV RBC AUTO: 93 FL (ref 79–99)
MONOCYTES NFR BLD AUTO: 7.2 % (ref 3–13)
NEUTROPHILS NFR BLD AUTO: 68.2 % (ref 40–77)
NRBC BLD MANUAL-RTO: 0.1 % (ref 0–0.19)
PLATELET # BLD AUTO: 242 K/UL (ref 130–400)
POTASSIUM SERPL-SCNC: 3.8 MMOL/L (ref 3.5–5.1)
RBC # BLD AUTO: 2.83 MIL/UL (ref 4–5.5)
SODIUM SERPL-SCNC: 143 MMOL/L (ref 136–145)
WBC # BLD AUTO: 7.4 K/UL (ref 4.8–10.8)

## 2019-10-28 RX ADMIN — SODIUM CHLORIDE SCH GM: 9 INJECTION, SOLUTION INTRAVENOUS at 22:29

## 2019-10-28 RX ADMIN — INSULIN GLARGINE SCH UNITS: 100 INJECTION, SOLUTION SUBCUTANEOUS at 22:36

## 2019-10-28 RX ADMIN — DOCUSATE SODIUM SCH MG: 100 TABLET, FILM COATED ORAL at 22:29

## 2019-10-28 RX ADMIN — Medication SCH MG: at 11:54

## 2019-10-28 RX ADMIN — FLUCONAZOLE IN SODIUM CHLORIDE SCH MLS/HR: 2 INJECTION, SOLUTION INTRAVENOUS at 09:11

## 2019-10-28 RX ADMIN — Medication SCH MG: at 09:04

## 2019-10-28 RX ADMIN — Medication SCH MG: at 15:31

## 2019-10-28 RX ADMIN — FAMOTIDINE SCH MG: 10 INJECTION INTRAVENOUS at 09:04

## 2019-10-28 RX ADMIN — DOCUSATE SODIUM SCH MG: 100 TABLET, FILM COATED ORAL at 09:05

## 2019-10-28 RX ADMIN — INSULIN LISPRO SCH UNIT: 100 INJECTION, SOLUTION INTRAVENOUS; SUBCUTANEOUS at 06:14

## 2019-10-28 RX ADMIN — PREGABALIN SCH MG: 75 CAPSULE ORAL at 09:04

## 2019-10-28 RX ADMIN — INSULIN LISPRO SCH UNIT: 100 INJECTION, SOLUTION INTRAVENOUS; SUBCUTANEOUS at 11:57

## 2019-10-28 RX ADMIN — Medication SCH MG: at 19:40

## 2019-10-28 RX ADMIN — INSULIN LISPRO SCH UNIT: 100 INJECTION, SOLUTION INTRAVENOUS; SUBCUTANEOUS at 17:00

## 2019-10-28 RX ADMIN — SODIUM CHLORIDE SCH GM: 9 INJECTION, SOLUTION INTRAVENOUS at 06:21

## 2019-10-28 RX ADMIN — Medication SCH MG: at 23:45

## 2019-10-28 RX ADMIN — NIFEDIPINE SCH MG: 30 TABLET, FILM COATED, EXTENDED RELEASE ORAL at 09:05

## 2019-10-28 RX ADMIN — DONEPEZIL HYDROCHLORIDE SCH MG: 5 TABLET, FILM COATED ORAL at 09:04

## 2019-10-28 RX ADMIN — Medication SCH MG: at 06:22

## 2019-10-28 RX ADMIN — Medication SCH MG: at 22:29

## 2019-10-28 RX ADMIN — ENOXAPARIN SODIUM SCH MG: 30 INJECTION SUBCUTANEOUS at 09:05

## 2019-10-28 NOTE — NUR
Nutrition Intervention:

Nutrition screen based on LOS x 5 days.  Pt. admitted with Dx of diabetic ketoacidosis.  Pt. 
pending SNF placement.  Unable to speak with pt. during RD visit due to pt. was vomiting at 
the time- CNA aware. Pt. has Zofran prescribed for upper GI distress.  Pt. on 75gm CCD diet 
with good p.o.intake.  Labs reviewed(Alb 3.1, , HgbA1c 12.7%).  LBM: 10/26/19.  SR-18, 
perineum pustules.  BMI: 20.8, appropriate for age.  



Recommendations:

1) Rec. 60g CCD diet.

2) Continue to monitor pt's nutritional status.

3) Consult RD as nutrition concerns arise.




-------------------------------------------------------------------------------

Addendum: 10/28/19 at 1732 by ALVIN PLATA RD

-------------------------------------------------------------------------------

Amended: Links added.

## 2019-10-28 NOTE — NUR
Changes Of Mind In Placement 



Pt and family changed their mind, refused placement to [facility] and opted for [facility] 
In patient rehab. Pending authorization and acceptance.

## 2019-10-29 VITALS — SYSTOLIC BLOOD PRESSURE: 127 MMHG | DIASTOLIC BLOOD PRESSURE: 55 MMHG

## 2019-10-29 VITALS — SYSTOLIC BLOOD PRESSURE: 133 MMHG | DIASTOLIC BLOOD PRESSURE: 61 MMHG

## 2019-10-29 VITALS — DIASTOLIC BLOOD PRESSURE: 60 MMHG | SYSTOLIC BLOOD PRESSURE: 121 MMHG

## 2019-10-29 RX ADMIN — Medication SCH MG: at 08:10

## 2019-10-29 RX ADMIN — INSULIN LISPRO SCH UNIT: 100 INJECTION, SOLUTION INTRAVENOUS; SUBCUTANEOUS at 17:19

## 2019-10-29 RX ADMIN — INSULIN LISPRO SCH UNIT: 100 INJECTION, SOLUTION INTRAVENOUS; SUBCUTANEOUS at 12:00

## 2019-10-29 RX ADMIN — NIFEDIPINE SCH MG: 30 TABLET, FILM COATED, EXTENDED RELEASE ORAL at 08:11

## 2019-10-29 RX ADMIN — Medication SCH MG: at 18:49

## 2019-10-29 RX ADMIN — DONEPEZIL HYDROCHLORIDE SCH MG: 5 TABLET, FILM COATED ORAL at 08:08

## 2019-10-29 RX ADMIN — FAMOTIDINE SCH MG: 10 INJECTION INTRAVENOUS at 08:08

## 2019-10-29 RX ADMIN — DOCUSATE SODIUM SCH MG: 100 TABLET, FILM COATED ORAL at 08:08

## 2019-10-29 RX ADMIN — Medication SCH MG: at 11:31

## 2019-10-29 RX ADMIN — ENOXAPARIN SODIUM SCH MG: 30 INJECTION SUBCUTANEOUS at 08:11

## 2019-10-29 RX ADMIN — SODIUM CHLORIDE SCH GM: 9 INJECTION, SOLUTION INTRAVENOUS at 08:08

## 2019-10-29 RX ADMIN — PREGABALIN SCH MG: 75 CAPSULE ORAL at 08:11

## 2019-10-29 RX ADMIN — Medication SCH MG: at 16:04

## 2019-10-29 RX ADMIN — Medication SCH MG: at 07:09

## 2019-10-29 RX ADMIN — INSULIN LISPRO SCH UNIT: 100 INJECTION, SOLUTION INTRAVENOUS; SUBCUTANEOUS at 07:13

## 2019-10-29 NOTE — NUR
PT D/C



PT FINALLY D/C, DAUGHTER (DELBERT) WAS HERE TO  PT @ 2005, D/C INSTRUCTION PROVIDED 
AND EXPLAINED TO PT AND FAMILY MEMBER, PT IS GOING TO ROOM 4313 AT [facility]. PT AND FAMILY TO CALL [phone] AT THE  AND WILL BE 
DIRECTED, PT AND FAMILY VERBALIZED UNDERSTANDING OF D/C INSTRUCTION, PT SAFELY D/C WITH 
PRIVATE TRANSPORT. REPORT GIVEN TO  ISABELLA.

## 2019-10-29 NOTE — NUR
CALL PLACED TO [facility]



Call placed to [facility], Spoke with Marlene, She 
stated as at the time they are unable to take report and will call this writer back as soon 
as they become available, Gave them #[phone] to call when ready. Nursing will continue to 
follow up. Daughter with POA also made aware and updated.

## 2019-10-29 NOTE — NUR
PATIENT HAS BEEN  ACCPTED AT [facility]- RN TIRED TO CALL REPORT JEN BUT NO ONE PICKED UP. 
AURELIA ALSO TIRED, MULTIPEL TIMES THROUGH THE MAIN NUMBER AND  DIRECTLY- BUT WAS NOT ABLE TO 
SPEAK TO ANYONE. WILL AWAIT  TRANSPORT 

-------------------------------------------------------------------------------

Addendum: 10/29/19 at 1521 by CORONA MCCLOUD RN CM

-------------------------------------------------------------------------------

Amended: Links added.

## 2019-10-29 NOTE — NUR
[facility]



ISSUE ESCALATED TO [place] REGARDING GIVING REPORT TO NURSE IN [facility], HOUSE ALSO ESCALATED 
THE ISSUE, LATER GOT A CALL FROM ISABELLA, REPORT GIVEN, FAMILY MEMBER (DELBERT) NOT AVAILABLE 
TO ANSWER CALL TO PROVIDE TRANSPORTATION TO PT. NURSING WILL CONTINUE TO FOLLOW UP.

## 2019-10-29 NOTE — NUR
[facility]

 RN ATTEMPTED TO CALL REPORT EARLIER BUT NO ONE PICKED UP. 

CM ALSO ATTEMPTED, MULTIPLE TIMES THROUGH THE MAIN NUMBER [id number]  [id number]   [id number] 
BUT WAS NOT ABLE TO SPEAK TO ANYONE. 

WILL AWAIT  TRANSPORT.

## 2020-06-14 ENCOUNTER — HOSPITAL ENCOUNTER (INPATIENT)
Dept: HOSPITAL 90 - EDH | Age: 70
LOS: 9 days | Discharge: HOME HEALTH | DRG: 70 | End: 2020-06-23
Attending: FAMILY MEDICINE | Admitting: FAMILY MEDICINE
Payer: MEDICARE

## 2020-06-14 VITALS — DIASTOLIC BLOOD PRESSURE: 45 MMHG | SYSTOLIC BLOOD PRESSURE: 92 MMHG

## 2020-06-14 VITALS — SYSTOLIC BLOOD PRESSURE: 90 MMHG | DIASTOLIC BLOOD PRESSURE: 67 MMHG

## 2020-06-14 VITALS — SYSTOLIC BLOOD PRESSURE: 101 MMHG | DIASTOLIC BLOOD PRESSURE: 46 MMHG

## 2020-06-14 VITALS — DIASTOLIC BLOOD PRESSURE: 37 MMHG | SYSTOLIC BLOOD PRESSURE: 104 MMHG

## 2020-06-14 VITALS — SYSTOLIC BLOOD PRESSURE: 105 MMHG | DIASTOLIC BLOOD PRESSURE: 42 MMHG

## 2020-06-14 VITALS — SYSTOLIC BLOOD PRESSURE: 96 MMHG | DIASTOLIC BLOOD PRESSURE: 34 MMHG

## 2020-06-14 VITALS — DIASTOLIC BLOOD PRESSURE: 48 MMHG | SYSTOLIC BLOOD PRESSURE: 92 MMHG

## 2020-06-14 VITALS — SYSTOLIC BLOOD PRESSURE: 96 MMHG | DIASTOLIC BLOOD PRESSURE: 40 MMHG

## 2020-06-14 VITALS — DIASTOLIC BLOOD PRESSURE: 50 MMHG | SYSTOLIC BLOOD PRESSURE: 112 MMHG

## 2020-06-14 VITALS — SYSTOLIC BLOOD PRESSURE: 111 MMHG | DIASTOLIC BLOOD PRESSURE: 61 MMHG

## 2020-06-14 VITALS — DIASTOLIC BLOOD PRESSURE: 51 MMHG | SYSTOLIC BLOOD PRESSURE: 119 MMHG

## 2020-06-14 VITALS — HEIGHT: 64 IN | BODY MASS INDEX: 21.24 KG/M2 | WEIGHT: 124.4 LBS

## 2020-06-14 VITALS — DIASTOLIC BLOOD PRESSURE: 42 MMHG | SYSTOLIC BLOOD PRESSURE: 102 MMHG

## 2020-06-14 VITALS — SYSTOLIC BLOOD PRESSURE: 103 MMHG | DIASTOLIC BLOOD PRESSURE: 38 MMHG

## 2020-06-14 VITALS — SYSTOLIC BLOOD PRESSURE: 93 MMHG | DIASTOLIC BLOOD PRESSURE: 45 MMHG

## 2020-06-14 VITALS — SYSTOLIC BLOOD PRESSURE: 104 MMHG | DIASTOLIC BLOOD PRESSURE: 44 MMHG

## 2020-06-14 DIAGNOSIS — E44.0: ICD-10-CM

## 2020-06-14 DIAGNOSIS — N17.9: ICD-10-CM

## 2020-06-14 DIAGNOSIS — G30.9: ICD-10-CM

## 2020-06-14 DIAGNOSIS — N13.6: ICD-10-CM

## 2020-06-14 DIAGNOSIS — I12.9: ICD-10-CM

## 2020-06-14 DIAGNOSIS — Z87.440: ICD-10-CM

## 2020-06-14 DIAGNOSIS — N18.9: ICD-10-CM

## 2020-06-14 DIAGNOSIS — E87.6: ICD-10-CM

## 2020-06-14 DIAGNOSIS — R74.8: ICD-10-CM

## 2020-06-14 DIAGNOSIS — Z79.4: ICD-10-CM

## 2020-06-14 DIAGNOSIS — K85.90: ICD-10-CM

## 2020-06-14 DIAGNOSIS — B02.9: ICD-10-CM

## 2020-06-14 DIAGNOSIS — E10.10: ICD-10-CM

## 2020-06-14 DIAGNOSIS — Z83.3: ICD-10-CM

## 2020-06-14 DIAGNOSIS — E87.4: ICD-10-CM

## 2020-06-14 DIAGNOSIS — D64.9: ICD-10-CM

## 2020-06-14 DIAGNOSIS — E10.22: ICD-10-CM

## 2020-06-14 DIAGNOSIS — E87.5: ICD-10-CM

## 2020-06-14 DIAGNOSIS — F02.80: ICD-10-CM

## 2020-06-14 DIAGNOSIS — J44.9: ICD-10-CM

## 2020-06-14 DIAGNOSIS — G93.41: Primary | ICD-10-CM

## 2020-06-14 DIAGNOSIS — E86.1: ICD-10-CM

## 2020-06-14 DIAGNOSIS — E86.0: ICD-10-CM

## 2020-06-14 DIAGNOSIS — E10.40: ICD-10-CM

## 2020-06-14 LAB
ALBUMIN SERPL-MCNC: 3.6 G/DL (ref 3.5–5)
ALT SERPL-CCNC: 11 U/L (ref 12–78)
AMYLASE SERPL-CCNC: 109 U/L (ref 25–115)
APPEARANCE UR: (no result)
AST SERPL-CCNC: 8 U/L (ref 10–37)
BASE EXCESS BLDA CALC-SCNC: -20.8 MMOL/L (ref -2–3)
BASOPHILS NFR BLD AUTO: 0.1 % (ref 0–5)
BILIRUB SERPL-MCNC: 0.7 MG/DL (ref 0.2–1)
BILIRUB UR QL STRIP: NEGATIVE
BNP SERPL-MCNC: 116 PG/ML (ref 0–100)
BUN SERPL-MCNC: 104 MG/DL (ref 7–18)
BUN SERPL-MCNC: 116 MG/DL (ref 7–18)
BUN SERPL-MCNC: 96 MG/DL (ref 7–18)
CHLORIDE SERPL-SCNC: 102 MMOL/L (ref 101–111)
CHLORIDE SERPL-SCNC: 110 MMOL/L (ref 101–111)
CHLORIDE SERPL-SCNC: 85 MMOL/L (ref 101–111)
CK SERPL-CCNC: 32 U/L (ref 21–232)
CO2 SERPL-SCNC: 20 MMOL/L (ref 21–32)
CO2 SERPL-SCNC: 24 MMOL/L (ref 21–32)
CO2 SERPL-SCNC: 9 MMOL/L (ref 21–32)
COLOR UR: (no result)
CREAT SERPL-MCNC: 4.3 MG/DL (ref 0.5–1.5)
CREAT SERPL-MCNC: 5.1 MG/DL (ref 0.5–1.5)
CREAT SERPL-MCNC: 5.8 MG/DL (ref 0.5–1.5)
EOSINOPHIL NFR BLD AUTO: 1.7 % (ref 0–8)
ERYTHROCYTE [DISTWIDTH] IN BLOOD BY AUTOMATED COUNT: 13.6 % (ref 11–15.5)
GFR SERPL CREATININE-BSD FRML MDRD: 11 ML/MIN (ref 60–?)
GFR SERPL CREATININE-BSD FRML MDRD: 8 ML/MIN (ref 60–?)
GFR SERPL CREATININE-BSD FRML MDRD: 9 ML/MIN (ref 60–?)
GLUCOSE SERPL-MCNC: 1027 MG/DL (ref 70–105)
GLUCOSE SERPL-MCNC: 174 MG/DL (ref 70–105)
GLUCOSE SERPL-MCNC: 408 MG/DL (ref 70–105)
GLUCOSE UR STRIP-MCNC: 250 MG/DL
HCO3 BLDA-SCNC: 6.1 MMOL/L (ref 21–28)
HCT VFR BLD AUTO: 34 % (ref 36–48)
HGB UR QL STRIP: (no result)
KETONES UR STRIP-MCNC: 15 MG/DL
LEUKOCYTE ESTERASE UR QL STRIP: (no result)
LIPASE SERPL-CCNC: 1170 U/L (ref 114–286)
LYMPHOCYTES NFR SPEC AUTO: 3.8 % (ref 21–51)
MCH RBC QN AUTO: 30.5 PG (ref 27–33)
MCHC RBC AUTO-ENTMCNC: 30.6 G/DL (ref 32–36)
MCV RBC AUTO: 99.7 FL (ref 79–99)
MONOCYTES NFR BLD AUTO: 5.9 % (ref 3–13)
NEUTROPHILS NFR BLD AUTO: 88.1 % (ref 40–77)
NITRITE UR QL STRIP: POSITIVE
NRBC BLD MANUAL-RTO: 0 % (ref 0–0.19)
PCO2 BLDA: 18 MMHG (ref 32–45)
PH UR STRIP: 5 [PH] (ref 5–8)
PLATELET # BLD AUTO: 338 K/UL (ref 130–400)
POTASSIUM SERPL-SCNC: 3.7 MMOL/L (ref 3.5–5.1)
POTASSIUM SERPL-SCNC: 3.8 MMOL/L (ref 3.5–5.1)
POTASSIUM SERPL-SCNC: 6.2 MMOL/L (ref 3.5–5.1)
PROT SERPL-MCNC: 8.4 G/DL (ref 6–8.3)
PROT UR QL STRIP: 100 MG/DL
RBC # BLD AUTO: 3.41 MIL/UL (ref 4–5.5)
RBC #/AREA URNS HPF: (no result) /HPF (ref 0–1)
RBC MORPH BLD: (no result)
SAO2 % BLDA: 98 % (ref 95–99)
SODIUM SERPL-SCNC: 124 MMOL/L (ref 136–145)
SODIUM SERPL-SCNC: 141 MMOL/L (ref 136–145)
SODIUM SERPL-SCNC: 144 MMOL/L (ref 136–145)
SP GR UR STRIP: >=1.03 (ref 1–1.03)
UROBILINOGEN UR STRIP-MCNC: 1 MG/DL (ref 0.2–1)
WBC # BLD AUTO: 10.4 K/UL (ref 4.8–10.8)
WBC #/AREA URNS HPF: >100 /HPF (ref 0–1)

## 2020-06-14 PROCEDURE — 85014 HEMATOCRIT: CPT

## 2020-06-14 PROCEDURE — 36430 TRANSFUSION BLD/BLD COMPNT: CPT

## 2020-06-14 PROCEDURE — 82435 ASSAY OF BLOOD CHLORIDE: CPT

## 2020-06-14 PROCEDURE — 83690 ASSAY OF LIPASE: CPT

## 2020-06-14 PROCEDURE — 86334 IMMUNOFIX E-PHORESIS SERUM: CPT

## 2020-06-14 PROCEDURE — 71045 X-RAY EXAM CHEST 1 VIEW: CPT

## 2020-06-14 PROCEDURE — 83540 ASSAY OF IRON: CPT

## 2020-06-14 PROCEDURE — 86900 BLOOD TYPING SEROLOGIC ABO: CPT

## 2020-06-14 PROCEDURE — 80053 COMPREHEN METABOLIC PANEL: CPT

## 2020-06-14 PROCEDURE — 85018 HEMOGLOBIN: CPT

## 2020-06-14 PROCEDURE — 97039 UNLISTED MODALITY: CPT

## 2020-06-14 PROCEDURE — 83036 HEMOGLOBIN GLYCOSYLATED A1C: CPT

## 2020-06-14 PROCEDURE — 81001 URINALYSIS AUTO W/SCOPE: CPT

## 2020-06-14 PROCEDURE — 94640 AIRWAY INHALATION TREATMENT: CPT

## 2020-06-14 PROCEDURE — 84145 PROCALCITONIN (PCT): CPT

## 2020-06-14 PROCEDURE — 85025 COMPLETE CBC W/AUTO DIFF WBC: CPT

## 2020-06-14 PROCEDURE — 84132 ASSAY OF SERUM POTASSIUM: CPT

## 2020-06-14 PROCEDURE — 87088 URINE BACTERIA CULTURE: CPT

## 2020-06-14 PROCEDURE — 86850 RBC ANTIBODY SCREEN: CPT

## 2020-06-14 PROCEDURE — 76700 US EXAM ABDOM COMPLETE: CPT

## 2020-06-14 PROCEDURE — 83550 IRON BINDING TEST: CPT

## 2020-06-14 PROCEDURE — 84100 ASSAY OF PHOSPHORUS: CPT

## 2020-06-14 PROCEDURE — 86325 OTHER IMMUNOELECTROPHORESIS: CPT

## 2020-06-14 PROCEDURE — 76856 US EXAM PELVIC COMPLETE: CPT

## 2020-06-14 PROCEDURE — 83735 ASSAY OF MAGNESIUM: CPT

## 2020-06-14 PROCEDURE — 84166 PROTEIN E-PHORESIS/URINE/CSF: CPT

## 2020-06-14 PROCEDURE — 83883 ASSAY NEPHELOMETRY NOT SPEC: CPT

## 2020-06-14 PROCEDURE — 86922 COMPATIBILITY TEST ANTIGLOB: CPT

## 2020-06-14 PROCEDURE — 36600 WITHDRAWAL OF ARTERIAL BLOOD: CPT

## 2020-06-14 PROCEDURE — 82010 KETONE BODYS QUAN: CPT

## 2020-06-14 PROCEDURE — 36415 COLL VENOUS BLD VENIPUNCTURE: CPT

## 2020-06-14 PROCEDURE — 82150 ASSAY OF AMYLASE: CPT

## 2020-06-14 PROCEDURE — 82270 OCCULT BLOOD FECES: CPT

## 2020-06-14 PROCEDURE — 76770 US EXAM ABDO BACK WALL COMP: CPT

## 2020-06-14 PROCEDURE — 82948 REAGENT STRIP/BLOOD GLUCOSE: CPT

## 2020-06-14 PROCEDURE — 82803 BLOOD GASES ANY COMBINATION: CPT

## 2020-06-14 PROCEDURE — 83605 ASSAY OF LACTIC ACID: CPT

## 2020-06-14 PROCEDURE — 99291 CRITICAL CARE FIRST HOUR: CPT

## 2020-06-14 PROCEDURE — 80048 BASIC METABOLIC PNL TOTAL CA: CPT

## 2020-06-14 PROCEDURE — 74176 CT ABD & PELVIS W/O CONTRAST: CPT

## 2020-06-14 PROCEDURE — 87040 BLOOD CULTURE FOR BACTERIA: CPT

## 2020-06-14 PROCEDURE — 82550 ASSAY OF CK (CPK): CPT

## 2020-06-14 PROCEDURE — 86901 BLOOD TYPING SEROLOGIC RH(D): CPT

## 2020-06-14 PROCEDURE — 83880 ASSAY OF NATRIURETIC PEPTIDE: CPT

## 2020-06-14 PROCEDURE — 84295 ASSAY OF SERUM SODIUM: CPT

## 2020-06-14 PROCEDURE — 84156 ASSAY OF PROTEIN URINE: CPT

## 2020-06-14 PROCEDURE — 93005 ELECTROCARDIOGRAM TRACING: CPT

## 2020-06-14 PROCEDURE — 82947 ASSAY GLUCOSE BLOOD QUANT: CPT

## 2020-06-14 RX ADMIN — PIPERACILLIN SODIUM AND TAZOBACTAM SODIUM SCH MLS/HR: .375; 3 INJECTION, POWDER, LYOPHILIZED, FOR SOLUTION INTRAVENOUS at 14:15

## 2020-06-14 RX ADMIN — Medication SCH MLS/HR: at 15:17

## 2020-06-14 RX ADMIN — SODIUM CHLORIDE SCH MLS/HR: 0.9 INJECTION, SOLUTION INTRAVENOUS at 20:00

## 2020-06-14 RX ADMIN — Medication SCH MLS/HR: at 23:22

## 2020-06-14 RX ADMIN — SODIUM CHLORIDE SCH MLS/HR: 0.9 INJECTION, SOLUTION INTRAVENOUS at 19:00

## 2020-06-14 NOTE — NUR
2150 Glucometer down to 149. Insulin drip stopped as per orders. BMP drawn. Anion Gap down 
to 10. Had a normal colored formed and loose bm, incontinent of urine. Pericare done. Call 
light and needed items readily at hand.

## 2020-06-14 NOTE — NUR
Report received from Scarlet CALI on patient received in ICU at 1515. Oriented to own name. 
Confused statements. Oriented pt to place. Stated "Oh, I forgot I was in the hospital." 
Patient has dementia as per daughter according  to [facility] nurse Scarlet's report. States 
patient takes Aricept at home. Insulin drip infusing at 7 units/hr.  Initial assessment 
completed. Call light and needed items placed readily at hand. Encouraged to call prn.

## 2020-06-15 VITALS — DIASTOLIC BLOOD PRESSURE: 59 MMHG | SYSTOLIC BLOOD PRESSURE: 129 MMHG

## 2020-06-15 VITALS — DIASTOLIC BLOOD PRESSURE: 53 MMHG | SYSTOLIC BLOOD PRESSURE: 144 MMHG

## 2020-06-15 VITALS — SYSTOLIC BLOOD PRESSURE: 140 MMHG | DIASTOLIC BLOOD PRESSURE: 65 MMHG

## 2020-06-15 VITALS — SYSTOLIC BLOOD PRESSURE: 118 MMHG | DIASTOLIC BLOOD PRESSURE: 46 MMHG

## 2020-06-15 VITALS — SYSTOLIC BLOOD PRESSURE: 147 MMHG | DIASTOLIC BLOOD PRESSURE: 68 MMHG

## 2020-06-15 VITALS — SYSTOLIC BLOOD PRESSURE: 142 MMHG | DIASTOLIC BLOOD PRESSURE: 68 MMHG

## 2020-06-15 VITALS — SYSTOLIC BLOOD PRESSURE: 156 MMHG | DIASTOLIC BLOOD PRESSURE: 52 MMHG

## 2020-06-15 VITALS — DIASTOLIC BLOOD PRESSURE: 52 MMHG | SYSTOLIC BLOOD PRESSURE: 125 MMHG

## 2020-06-15 VITALS — SYSTOLIC BLOOD PRESSURE: 172 MMHG | DIASTOLIC BLOOD PRESSURE: 77 MMHG

## 2020-06-15 VITALS — DIASTOLIC BLOOD PRESSURE: 46 MMHG | SYSTOLIC BLOOD PRESSURE: 117 MMHG

## 2020-06-15 VITALS — SYSTOLIC BLOOD PRESSURE: 154 MMHG | DIASTOLIC BLOOD PRESSURE: 49 MMHG

## 2020-06-15 VITALS — SYSTOLIC BLOOD PRESSURE: 120 MMHG | DIASTOLIC BLOOD PRESSURE: 74 MMHG

## 2020-06-15 VITALS — SYSTOLIC BLOOD PRESSURE: 122 MMHG | DIASTOLIC BLOOD PRESSURE: 57 MMHG

## 2020-06-15 VITALS — SYSTOLIC BLOOD PRESSURE: 131 MMHG | DIASTOLIC BLOOD PRESSURE: 50 MMHG

## 2020-06-15 VITALS — DIASTOLIC BLOOD PRESSURE: 74 MMHG | SYSTOLIC BLOOD PRESSURE: 120 MMHG

## 2020-06-15 VITALS — SYSTOLIC BLOOD PRESSURE: 147 MMHG | DIASTOLIC BLOOD PRESSURE: 63 MMHG

## 2020-06-15 VITALS — DIASTOLIC BLOOD PRESSURE: 77 MMHG | SYSTOLIC BLOOD PRESSURE: 126 MMHG

## 2020-06-15 LAB
ALBUMIN SERPL-MCNC: 2.4 G/DL (ref 3.5–5)
ALT SERPL-CCNC: 8 U/L (ref 12–78)
AST SERPL-CCNC: 10 U/L (ref 10–37)
BASE EXCESS BLDA CALC-SCNC: -6.8 MMOL/L (ref -2–3)
BASOPHILS NFR BLD AUTO: 0.2 % (ref 0–5)
BILIRUB SERPL-MCNC: 0.3 MG/DL (ref 0.2–1)
BUN SERPL-MCNC: 77 MG/DL (ref 7–18)
BUN SERPL-MCNC: 84 MG/DL (ref 7–18)
BUN SERPL-MCNC: 89 MG/DL (ref 7–18)
CHLORIDE SERPL-SCNC: 110 MMOL/L (ref 101–111)
CHLORIDE SERPL-SCNC: 111 MMOL/L (ref 101–111)
CHLORIDE SERPL-SCNC: 111 MMOL/L (ref 101–111)
CO2 SERPL-SCNC: 17 MMOL/L (ref 21–32)
CO2 SERPL-SCNC: 19 MMOL/L (ref 21–32)
CO2 SERPL-SCNC: 21 MMOL/L (ref 21–32)
CREAT SERPL-MCNC: 3.3 MG/DL (ref 0.5–1.5)
CREAT SERPL-MCNC: 3.6 MG/DL (ref 0.5–1.5)
CREAT SERPL-MCNC: 4 MG/DL (ref 0.5–1.5)
EOSINOPHIL NFR BLD AUTO: 0.7 % (ref 0–8)
ERYTHROCYTE [DISTWIDTH] IN BLOOD BY AUTOMATED COUNT: 13.1 % (ref 11–15.5)
GFR SERPL CREATININE-BSD FRML MDRD: 12 ML/MIN (ref 60–?)
GFR SERPL CREATININE-BSD FRML MDRD: 13 ML/MIN (ref 60–?)
GFR SERPL CREATININE-BSD FRML MDRD: 15 ML/MIN (ref 60–?)
GLUCOSE SERPL-MCNC: 184 MG/DL (ref 70–105)
GLUCOSE SERPL-MCNC: 271 MG/DL (ref 70–105)
GLUCOSE SERPL-MCNC: 280 MG/DL (ref 70–105)
HCO3 BLDA-SCNC: 18.5 MMOL/L (ref 21–28)
HCT VFR BLD AUTO: 25 % (ref 36–48)
LIPASE SERPL-CCNC: 235 U/L (ref 114–286)
LYMPHOCYTES NFR SPEC AUTO: 7.3 % (ref 21–51)
MCH RBC QN AUTO: 30.3 PG (ref 27–33)
MCHC RBC AUTO-ENTMCNC: 33.2 G/DL (ref 32–36)
MCV RBC AUTO: 91.2 FL (ref 79–99)
MONOCYTES NFR BLD AUTO: 8.6 % (ref 3–13)
NEUTROPHILS NFR BLD AUTO: 82.6 % (ref 40–77)
NRBC BLD MANUAL-RTO: 0 % (ref 0–0.19)
PCO2 BLDA: 36 MMHG (ref 32–45)
PLATELET # BLD AUTO: 254 K/UL (ref 130–400)
POTASSIUM SERPL-SCNC: 4 MMOL/L (ref 3.5–5.1)
POTASSIUM SERPL-SCNC: 4.1 MMOL/L (ref 3.5–5.1)
POTASSIUM SERPL-SCNC: 4.4 MMOL/L (ref 3.5–5.1)
PROT SERPL-MCNC: 5.8 G/DL (ref 6–8.3)
RBC # BLD AUTO: 2.74 MIL/UL (ref 4–5.5)
SAO2 % BLDA: 96.7 % (ref 95–99)
SODIUM SERPL-SCNC: 144 MMOL/L (ref 136–145)
SODIUM SERPL-SCNC: 145 MMOL/L (ref 136–145)
SODIUM SERPL-SCNC: 145 MMOL/L (ref 136–145)
WBC # BLD AUTO: 10.3 K/UL (ref 4.8–10.8)

## 2020-06-15 RX ADMIN — SODIUM CHLORIDE SCH GM: 9 INJECTION, SOLUTION INTRAVENOUS at 15:03

## 2020-06-15 RX ADMIN — INSULIN GLARGINE SCH UNITS: 100 INJECTION, SOLUTION SUBCUTANEOUS at 09:44

## 2020-06-15 RX ADMIN — FAMOTIDINE SCH MG: 10 INJECTION INTRAVENOUS at 09:35

## 2020-06-15 RX ADMIN — ENOXAPARIN SODIUM SCH MG: 40 INJECTION SUBCUTANEOUS at 09:35

## 2020-06-15 RX ADMIN — INSULIN LISPRO SCH UNIT: 100 INJECTION, SOLUTION INTRAVENOUS; SUBCUTANEOUS at 12:03

## 2020-06-15 RX ADMIN — Medication SCH MG: at 21:05

## 2020-06-15 RX ADMIN — INSULIN LISPRO SCH UNIT: 100 INJECTION, SOLUTION INTRAVENOUS; SUBCUTANEOUS at 12:04

## 2020-06-15 RX ADMIN — INSULIN GLARGINE SCH UNITS: 100 INJECTION, SOLUTION SUBCUTANEOUS at 21:04

## 2020-06-15 RX ADMIN — INSULIN LISPRO SCH UNIT: 100 INJECTION, SOLUTION INTRAVENOUS; SUBCUTANEOUS at 21:00

## 2020-06-15 RX ADMIN — INSULIN LISPRO SCH UNIT: 100 INJECTION, SOLUTION INTRAVENOUS; SUBCUTANEOUS at 16:30

## 2020-06-15 RX ADMIN — Medication SCH MLS/HR: at 15:03

## 2020-06-15 RX ADMIN — INSULIN LISPRO SCH UNIT: 100 INJECTION, SOLUTION INTRAVENOUS; SUBCUTANEOUS at 17:31

## 2020-06-15 RX ADMIN — Medication SCH MLS/HR: at 06:48

## 2020-06-15 RX ADMIN — PIPERACILLIN SODIUM AND TAZOBACTAM SODIUM SCH MLS/HR: .375; 3 INJECTION, POWDER, LYOPHILIZED, FOR SOLUTION INTRAVENOUS at 01:12

## 2020-06-15 NOTE — NUR
DC PLAN



VISITED WITH PATIENT. PATIENT POOR HISTORIAN. SPOKE TO DAUGHTER. PATIENT LIVES WITH HER. 
PATIENT HAS A WALKER. USES A GLUCOSE  AT HOME. WANTED INFO REGARDING MEALS ON WHEELS. 
GAVE PERMISSION TO GIVE INFO TO DAD'S FOR POSSIBLE HELP. MD ALSO RECOMMENDED HOME HEALTH FOR 
GLUCOSE CHECKS. PER DAUGHTER CHECKS SUGARS FOUR TIMES A DAY. BUT IS OKAY WITH HOME HEALTH. 
NO PREFERENCE SAID WHICH EVER MD RECOMMENDS. CALLED [facility] TO ASK IF HE HAD A 
PREFERANCE. NURSE SAID NO WHICHEVER THE HOSPITAL MD RECOMMENDS. INFO SENT TO [facility] "Hey, Neighbor!". 

-------------------------------------------------------------------------------

Addendum: 06/15/20 at 1351 by JAMEEL BELL RN CM

-------------------------------------------------------------------------------

Amended: Links added.

## 2020-06-16 VITALS — DIASTOLIC BLOOD PRESSURE: 42 MMHG | SYSTOLIC BLOOD PRESSURE: 127 MMHG

## 2020-06-16 VITALS — DIASTOLIC BLOOD PRESSURE: 50 MMHG | SYSTOLIC BLOOD PRESSURE: 141 MMHG

## 2020-06-16 VITALS — SYSTOLIC BLOOD PRESSURE: 152 MMHG | DIASTOLIC BLOOD PRESSURE: 82 MMHG

## 2020-06-16 VITALS — DIASTOLIC BLOOD PRESSURE: 54 MMHG | SYSTOLIC BLOOD PRESSURE: 146 MMHG

## 2020-06-16 VITALS — DIASTOLIC BLOOD PRESSURE: 43 MMHG | SYSTOLIC BLOOD PRESSURE: 111 MMHG

## 2020-06-16 VITALS — DIASTOLIC BLOOD PRESSURE: 80 MMHG | SYSTOLIC BLOOD PRESSURE: 155 MMHG

## 2020-06-16 LAB
ALBUMIN SERPL-MCNC: 2.1 G/DL (ref 3.5–5)
ALT SERPL-CCNC: 8 U/L (ref 12–78)
AST SERPL-CCNC: 9 U/L (ref 10–37)
BASOPHILS NFR BLD AUTO: 0.1 % (ref 0–5)
BILIRUB SERPL-MCNC: 0.2 MG/DL (ref 0.2–1)
BUN SERPL-MCNC: 45 MG/DL (ref 7–18)
CHLORIDE SERPL-SCNC: 109 MMOL/L (ref 101–111)
CO2 SERPL-SCNC: 25 MMOL/L (ref 21–32)
CREAT SERPL-MCNC: 1.8 MG/DL (ref 0.5–1.5)
EOSINOPHIL NFR BLD AUTO: 1.1 % (ref 0–8)
ERYTHROCYTE [DISTWIDTH] IN BLOOD BY AUTOMATED COUNT: 13.3 % (ref 11–15.5)
GFR SERPL CREATININE-BSD FRML MDRD: 29 ML/MIN (ref 60–?)
GLUCOSE SERPL-MCNC: 199 MG/DL (ref 70–105)
HBA1C MFR BLD: 10.4 % (ref 4–6)
HCT VFR BLD AUTO: 23.9 % (ref 36–48)
LYMPHOCYTES NFR SPEC AUTO: 16.4 % (ref 21–51)
MAGNESIUM SERPL-MCNC: 2 MG/DL (ref 1.8–2.4)
MCH RBC QN AUTO: 30.2 PG (ref 27–33)
MCHC RBC AUTO-ENTMCNC: 33.5 G/DL (ref 32–36)
MCV RBC AUTO: 90.2 FL (ref 79–99)
MONOCYTES NFR BLD AUTO: 10.1 % (ref 3–13)
NEUTROPHILS NFR BLD AUTO: 71.5 % (ref 40–77)
NRBC BLD MANUAL-RTO: 0 % (ref 0–0.19)
PHOSPHATE SERPL-MCNC: 1.5 MG/DL (ref 2.5–4.9)
PLATELET # BLD AUTO: 248 K/UL (ref 130–400)
POTASSIUM SERPL-SCNC: 3.1 MMOL/L (ref 3.5–5.1)
PROT SERPL-MCNC: 5.3 G/DL (ref 6–8.3)
RBC # BLD AUTO: 2.65 MIL/UL (ref 4–5.5)
SODIUM SERPL-SCNC: 142 MMOL/L (ref 136–145)
WBC # BLD AUTO: 7.6 K/UL (ref 4.8–10.8)

## 2020-06-16 RX ADMIN — Medication SCH MLS/HR: at 16:46

## 2020-06-16 RX ADMIN — Medication SCH MLS/HR: at 05:53

## 2020-06-16 RX ADMIN — INSULIN LISPRO SCH UNIT: 100 INJECTION, SOLUTION INTRAVENOUS; SUBCUTANEOUS at 06:04

## 2020-06-16 RX ADMIN — INSULIN LISPRO SCH UNIT: 100 INJECTION, SOLUTION INTRAVENOUS; SUBCUTANEOUS at 21:00

## 2020-06-16 RX ADMIN — INSULIN GLARGINE SCH UNITS: 100 INJECTION, SOLUTION SUBCUTANEOUS at 06:03

## 2020-06-16 RX ADMIN — ENOXAPARIN SODIUM SCH MG: 40 INJECTION SUBCUTANEOUS at 09:14

## 2020-06-16 RX ADMIN — INSULIN LISPRO SCH UNIT: 100 INJECTION, SOLUTION INTRAVENOUS; SUBCUTANEOUS at 09:15

## 2020-06-16 RX ADMIN — INSULIN LISPRO SCH UNIT: 100 INJECTION, SOLUTION INTRAVENOUS; SUBCUTANEOUS at 12:00

## 2020-06-16 RX ADMIN — POTASSIUM CHLORIDE PRN MEQ: 1500 TABLET, EXTENDED RELEASE ORAL at 10:34

## 2020-06-16 RX ADMIN — INSULIN LISPRO SCH UNIT: 100 INJECTION, SOLUTION INTRAVENOUS; SUBCUTANEOUS at 16:55

## 2020-06-16 RX ADMIN — INSULIN LISPRO SCH UNIT: 100 INJECTION, SOLUTION INTRAVENOUS; SUBCUTANEOUS at 16:30

## 2020-06-16 RX ADMIN — POTASSIUM CHLORIDE PRN MEQ: 1500 TABLET, EXTENDED RELEASE ORAL at 16:46

## 2020-06-16 RX ADMIN — SODIUM CHLORIDE SCH GM: 9 INJECTION, SOLUTION INTRAVENOUS at 16:54

## 2020-06-16 RX ADMIN — INSULIN LISPRO SCH UNIT: 100 INJECTION, SOLUTION INTRAVENOUS; SUBCUTANEOUS at 11:30

## 2020-06-16 RX ADMIN — Medication SCH MG: at 22:59

## 2020-06-16 RX ADMIN — Medication SCH MLS/HR: at 14:00

## 2020-06-16 RX ADMIN — FAMOTIDINE SCH MG: 10 INJECTION INTRAVENOUS at 09:14

## 2020-06-16 RX ADMIN — Medication SCH MG: at 09:14

## 2020-06-17 VITALS — DIASTOLIC BLOOD PRESSURE: 76 MMHG | SYSTOLIC BLOOD PRESSURE: 155 MMHG

## 2020-06-17 VITALS — SYSTOLIC BLOOD PRESSURE: 124 MMHG | DIASTOLIC BLOOD PRESSURE: 41 MMHG

## 2020-06-17 VITALS — SYSTOLIC BLOOD PRESSURE: 176 MMHG | DIASTOLIC BLOOD PRESSURE: 87 MMHG

## 2020-06-17 VITALS — SYSTOLIC BLOOD PRESSURE: 160 MMHG | DIASTOLIC BLOOD PRESSURE: 66 MMHG

## 2020-06-17 VITALS — DIASTOLIC BLOOD PRESSURE: 67 MMHG | SYSTOLIC BLOOD PRESSURE: 150 MMHG

## 2020-06-17 LAB
ALBUMIN SERPL-MCNC: 2 G/DL (ref 3.5–5)
ALT SERPL-CCNC: 6 U/L (ref 12–78)
AST SERPL-CCNC: 9 U/L (ref 10–37)
BASOPHILS NFR BLD AUTO: 0.1 % (ref 0–5)
BILIRUB SERPL-MCNC: 0.2 MG/DL (ref 0.2–1)
BUN SERPL-MCNC: 30 MG/DL (ref 7–18)
CHLORIDE SERPL-SCNC: 108 MMOL/L (ref 101–111)
CO2 SERPL-SCNC: 26 MMOL/L (ref 21–32)
CREAT SERPL-MCNC: 1.3 MG/DL (ref 0.5–1.5)
EOSINOPHIL NFR BLD AUTO: 0.6 % (ref 0–8)
ERYTHROCYTE [DISTWIDTH] IN BLOOD BY AUTOMATED COUNT: 13.2 % (ref 11–15.5)
GFR SERPL CREATININE-BSD FRML MDRD: 43 ML/MIN (ref 60–?)
GLUCOSE SERPL-MCNC: 131 MG/DL (ref 70–105)
HCT VFR BLD AUTO: 26.4 % (ref 36–48)
IRON SATN MFR SERPL: 30.8 % (ref 22–44)
IRON SERPL-MCNC: 33 MCG/DL (ref 50–170)
LYMPHOCYTES NFR SPEC AUTO: 20 % (ref 21–51)
MCH RBC QN AUTO: 30.2 PG (ref 27–33)
MCHC RBC AUTO-ENTMCNC: 33.3 G/DL (ref 32–36)
MCV RBC AUTO: 90.7 FL (ref 79–99)
MONOCYTES NFR BLD AUTO: 8.7 % (ref 3–13)
NEUTROPHILS NFR BLD AUTO: 69.9 % (ref 40–77)
NRBC BLD MANUAL-RTO: 0 % (ref 0–0.19)
PLATELET # BLD AUTO: 247 K/UL (ref 130–400)
POTASSIUM SERPL-SCNC: 3.3 MMOL/L (ref 3.5–5.1)
PROT SERPL-MCNC: 5.5 G/DL (ref 6–8.3)
RBC # BLD AUTO: 2.91 MIL/UL (ref 4–5.5)
SODIUM SERPL-SCNC: 141 MMOL/L (ref 136–145)
TIBC SERPL-MCNC: 107 MCG/DL (ref 250–450)
WBC # BLD AUTO: 6.7 K/UL (ref 4.8–10.8)

## 2020-06-17 RX ADMIN — POTASSIUM CHLORIDE PRN MEQ: 1500 TABLET, EXTENDED RELEASE ORAL at 18:44

## 2020-06-17 RX ADMIN — Medication SCH MG: at 21:00

## 2020-06-17 RX ADMIN — INSULIN LISPRO SCH UNIT: 100 INJECTION, SOLUTION INTRAVENOUS; SUBCUTANEOUS at 16:30

## 2020-06-17 RX ADMIN — INSULIN LISPRO SCH UNIT: 100 INJECTION, SOLUTION INTRAVENOUS; SUBCUTANEOUS at 12:51

## 2020-06-17 RX ADMIN — POTASSIUM CHLORIDE PRN MEQ: 1500 TABLET, EXTENDED RELEASE ORAL at 18:41

## 2020-06-17 RX ADMIN — FAMOTIDINE SCH MG: 10 INJECTION INTRAVENOUS at 10:18

## 2020-06-17 RX ADMIN — NIFEDIPINE SCH MG: 30 TABLET, FILM COATED, EXTENDED RELEASE ORAL at 10:19

## 2020-06-17 RX ADMIN — INSULIN LISPRO SCH UNIT: 100 INJECTION, SOLUTION INTRAVENOUS; SUBCUTANEOUS at 08:30

## 2020-06-17 RX ADMIN — INSULIN LISPRO SCH UNIT: 100 INJECTION, SOLUTION INTRAVENOUS; SUBCUTANEOUS at 07:30

## 2020-06-17 RX ADMIN — INSULIN LISPRO SCH UNIT: 100 INJECTION, SOLUTION INTRAVENOUS; SUBCUTANEOUS at 17:41

## 2020-06-17 RX ADMIN — INSULIN LISPRO SCH UNIT: 100 INJECTION, SOLUTION INTRAVENOUS; SUBCUTANEOUS at 12:52

## 2020-06-17 RX ADMIN — GABAPENTIN SCH MG: 100 CAPSULE ORAL at 14:21

## 2020-06-17 RX ADMIN — SODIUM CHLORIDE SCH GM: 9 INJECTION, SOLUTION INTRAVENOUS at 12:47

## 2020-06-17 RX ADMIN — GABAPENTIN SCH MG: 100 CAPSULE ORAL at 21:40

## 2020-06-17 RX ADMIN — Medication SCH MLS/HR: at 05:07

## 2020-06-17 RX ADMIN — FLUCONAZOLE SCH MG: 100 TABLET ORAL at 10:19

## 2020-06-17 RX ADMIN — Medication SCH MG: at 10:19

## 2020-06-17 RX ADMIN — ENOXAPARIN SODIUM SCH MG: 40 INJECTION SUBCUTANEOUS at 10:20

## 2020-06-17 RX ADMIN — Medication SCH MLS/HR: at 18:39

## 2020-06-17 RX ADMIN — DONEPEZIL HYDROCHLORIDE SCH MG: 5 TABLET, FILM COATED ORAL at 21:40

## 2020-06-17 RX ADMIN — INSULIN LISPRO SCH UNIT: 100 INJECTION, SOLUTION INTRAVENOUS; SUBCUTANEOUS at 21:00

## 2020-06-17 RX ADMIN — POTASSIUM CHLORIDE PRN MEQ: 1500 TABLET, EXTENDED RELEASE ORAL at 21:59

## 2020-06-18 VITALS — SYSTOLIC BLOOD PRESSURE: 101 MMHG | DIASTOLIC BLOOD PRESSURE: 59 MMHG

## 2020-06-18 VITALS — DIASTOLIC BLOOD PRESSURE: 58 MMHG | SYSTOLIC BLOOD PRESSURE: 126 MMHG

## 2020-06-18 VITALS — DIASTOLIC BLOOD PRESSURE: 41 MMHG | SYSTOLIC BLOOD PRESSURE: 100 MMHG

## 2020-06-18 VITALS — DIASTOLIC BLOOD PRESSURE: 55 MMHG | SYSTOLIC BLOOD PRESSURE: 123 MMHG

## 2020-06-18 VITALS — SYSTOLIC BLOOD PRESSURE: 123 MMHG | DIASTOLIC BLOOD PRESSURE: 53 MMHG

## 2020-06-18 VITALS — DIASTOLIC BLOOD PRESSURE: 43 MMHG | SYSTOLIC BLOOD PRESSURE: 101 MMHG

## 2020-06-18 LAB
BUN SERPL-MCNC: 22 MG/DL (ref 7–18)
CHLORIDE SERPL-SCNC: 104 MMOL/L (ref 101–111)
CO2 SERPL-SCNC: 26 MMOL/L (ref 21–32)
CREAT SERPL-MCNC: 1 MG/DL (ref 0.5–1.5)
GFR SERPL CREATININE-BSD FRML MDRD: 58 ML/MIN (ref 60–?)
GLUCOSE SERPL-MCNC: 112 MG/DL (ref 70–105)
MAGNESIUM SERPL-MCNC: 2.3 MG/DL (ref 1.8–2.4)
POTASSIUM SERPL-SCNC: 3.3 MMOL/L (ref 3.5–5.1)
SODIUM SERPL-SCNC: 138 MMOL/L (ref 136–145)

## 2020-06-18 RX ADMIN — INSULIN LISPRO SCH UNIT: 100 INJECTION, SOLUTION INTRAVENOUS; SUBCUTANEOUS at 16:30

## 2020-06-18 RX ADMIN — Medication SCH MLS/HR: at 10:24

## 2020-06-18 RX ADMIN — NIFEDIPINE SCH MG: 30 TABLET, FILM COATED, EXTENDED RELEASE ORAL at 10:19

## 2020-06-18 RX ADMIN — FLUCONAZOLE SCH MG: 100 TABLET ORAL at 10:18

## 2020-06-18 RX ADMIN — GABAPENTIN SCH MG: 100 CAPSULE ORAL at 10:18

## 2020-06-18 RX ADMIN — POTASSIUM CHLORIDE PRN MEQ: 1500 TABLET, EXTENDED RELEASE ORAL at 10:26

## 2020-06-18 RX ADMIN — SODIUM CHLORIDE SCH GM: 9 INJECTION, SOLUTION INTRAVENOUS at 11:52

## 2020-06-18 RX ADMIN — INSULIN LISPRO SCH UNIT: 100 INJECTION, SOLUTION INTRAVENOUS; SUBCUTANEOUS at 11:51

## 2020-06-18 RX ADMIN — Medication SCH MG: at 21:06

## 2020-06-18 RX ADMIN — FLUCONAZOLE SCH MG: 100 TABLET ORAL at 14:47

## 2020-06-18 RX ADMIN — INSULIN LISPRO SCH UNIT: 100 INJECTION, SOLUTION INTRAVENOUS; SUBCUTANEOUS at 06:55

## 2020-06-18 RX ADMIN — INSULIN LISPRO SCH UNIT: 100 INJECTION, SOLUTION INTRAVENOUS; SUBCUTANEOUS at 10:21

## 2020-06-18 RX ADMIN — ENOXAPARIN SODIUM SCH MG: 40 INJECTION SUBCUTANEOUS at 10:18

## 2020-06-18 RX ADMIN — GABAPENTIN SCH MG: 100 CAPSULE ORAL at 21:06

## 2020-06-18 RX ADMIN — FAMOTIDINE SCH MG: 10 INJECTION INTRAVENOUS at 10:18

## 2020-06-18 RX ADMIN — DONEPEZIL HYDROCHLORIDE SCH MG: 5 TABLET, FILM COATED ORAL at 21:06

## 2020-06-18 RX ADMIN — INSULIN LISPRO SCH UNIT: 100 INJECTION, SOLUTION INTRAVENOUS; SUBCUTANEOUS at 16:53

## 2020-06-18 RX ADMIN — GABAPENTIN SCH MG: 100 CAPSULE ORAL at 13:05

## 2020-06-18 RX ADMIN — POTASSIUM CHLORIDE PRN MEQ: 1500 TABLET, EXTENDED RELEASE ORAL at 06:55

## 2020-06-18 RX ADMIN — Medication SCH MG: at 10:18

## 2020-06-18 RX ADMIN — INSULIN LISPRO SCH UNIT: 100 INJECTION, SOLUTION INTRAVENOUS; SUBCUTANEOUS at 11:49

## 2020-06-18 RX ADMIN — INSULIN LISPRO SCH UNIT: 100 INJECTION, SOLUTION INTRAVENOUS; SUBCUTANEOUS at 21:00

## 2020-06-18 NOTE — NUR
DISPO HOME WITH HOME HEALTH 

 REFERRAL TO [facility]

-------------------------------------------------------------------------------

Addendum: 06/18/20 at 1642 by CORONA MCCLOUD RN CM

-------------------------------------------------------------------------------

Amended: Links added.

## 2020-06-18 NOTE — NUR
INDWELLING CATHETER SWITCHED FR 16 Uzbek TO 18 FR 3 WAY GARCIA

Pt received with a garcia cath in place, urine cloudy orange , adequate in amount, reported 
with hematuria, none noted. Catheter discontinued and inserted an 18 Kis[address] way garcia cath 
as suggested by Dr. Wisdom. CBI with NS started, urine less concentrated, started clearing 
up, will continue to monitor.Pt tolerated the procedure well.

## 2020-06-19 VITALS — DIASTOLIC BLOOD PRESSURE: 44 MMHG | SYSTOLIC BLOOD PRESSURE: 96 MMHG

## 2020-06-19 VITALS — SYSTOLIC BLOOD PRESSURE: 141 MMHG | DIASTOLIC BLOOD PRESSURE: 77 MMHG

## 2020-06-19 VITALS — SYSTOLIC BLOOD PRESSURE: 100 MMHG | DIASTOLIC BLOOD PRESSURE: 46 MMHG

## 2020-06-19 VITALS — SYSTOLIC BLOOD PRESSURE: 133 MMHG | DIASTOLIC BLOOD PRESSURE: 68 MMHG

## 2020-06-19 VITALS — SYSTOLIC BLOOD PRESSURE: 95 MMHG | DIASTOLIC BLOOD PRESSURE: 41 MMHG

## 2020-06-19 VITALS — SYSTOLIC BLOOD PRESSURE: 144 MMHG | DIASTOLIC BLOOD PRESSURE: 72 MMHG

## 2020-06-19 LAB
BUN SERPL-MCNC: 16 MG/DL (ref 7–18)
CHLORIDE SERPL-SCNC: 107 MMOL/L (ref 101–111)
CO2 SERPL-SCNC: 28 MMOL/L (ref 21–32)
CREAT SERPL-MCNC: 0.8 MG/DL (ref 0.5–1.5)
GFR SERPL CREATININE-BSD FRML MDRD: 75 ML/MIN (ref 60–?)
GLUCOSE SERPL-MCNC: 153 MG/DL (ref 70–105)
HCT VFR BLD AUTO: 23 % (ref 36–48)
MAGNESIUM SERPL-MCNC: 1.9 MG/DL (ref 1.8–2.4)
POTASSIUM SERPL-SCNC: 3.9 MMOL/L (ref 3.5–5.1)
SODIUM SERPL-SCNC: 140 MMOL/L (ref 136–145)

## 2020-06-19 RX ADMIN — INSULIN LISPRO SCH UNIT: 100 INJECTION, SOLUTION INTRAVENOUS; SUBCUTANEOUS at 13:57

## 2020-06-19 RX ADMIN — DONEPEZIL HYDROCHLORIDE SCH MG: 5 TABLET, FILM COATED ORAL at 20:51

## 2020-06-19 RX ADMIN — GABAPENTIN SCH MG: 100 CAPSULE ORAL at 20:51

## 2020-06-19 RX ADMIN — FAMOTIDINE SCH MG: 10 INJECTION INTRAVENOUS at 08:23

## 2020-06-19 RX ADMIN — Medication SCH MLS/HR: at 03:14

## 2020-06-19 RX ADMIN — Medication SCH MLS/HR: at 20:50

## 2020-06-19 RX ADMIN — GABAPENTIN SCH MG: 100 CAPSULE ORAL at 13:49

## 2020-06-19 RX ADMIN — INSULIN LISPRO SCH UNIT: 100 INJECTION, SOLUTION INTRAVENOUS; SUBCUTANEOUS at 11:30

## 2020-06-19 RX ADMIN — GABAPENTIN SCH MG: 100 CAPSULE ORAL at 08:23

## 2020-06-19 RX ADMIN — INSULIN LISPRO SCH UNIT: 100 INJECTION, SOLUTION INTRAVENOUS; SUBCUTANEOUS at 18:19

## 2020-06-19 RX ADMIN — INSULIN LISPRO SCH UNIT: 100 INJECTION, SOLUTION INTRAVENOUS; SUBCUTANEOUS at 06:23

## 2020-06-19 RX ADMIN — INSULIN LISPRO SCH UNIT: 100 INJECTION, SOLUTION INTRAVENOUS; SUBCUTANEOUS at 20:58

## 2020-06-19 RX ADMIN — NIFEDIPINE SCH MG: 30 TABLET, FILM COATED, EXTENDED RELEASE ORAL at 08:23

## 2020-06-19 RX ADMIN — INSULIN LISPRO SCH UNIT: 100 INJECTION, SOLUTION INTRAVENOUS; SUBCUTANEOUS at 08:32

## 2020-06-19 RX ADMIN — Medication SCH MG: at 08:23

## 2020-06-19 RX ADMIN — Medication SCH MG: at 20:51

## 2020-06-19 RX ADMIN — INSULIN LISPRO SCH UNIT: 100 INJECTION, SOLUTION INTRAVENOUS; SUBCUTANEOUS at 18:21

## 2020-06-19 RX ADMIN — SODIUM CHLORIDE SCH GM: 9 INJECTION, SOLUTION INTRAVENOUS at 13:50

## 2020-06-19 RX ADMIN — FLUCONAZOLE SCH MG: 100 TABLET ORAL at 13:50

## 2020-06-20 VITALS — DIASTOLIC BLOOD PRESSURE: 49 MMHG | SYSTOLIC BLOOD PRESSURE: 95 MMHG

## 2020-06-20 VITALS — DIASTOLIC BLOOD PRESSURE: 46 MMHG | SYSTOLIC BLOOD PRESSURE: 129 MMHG

## 2020-06-20 VITALS — SYSTOLIC BLOOD PRESSURE: 109 MMHG | DIASTOLIC BLOOD PRESSURE: 49 MMHG

## 2020-06-20 VITALS — SYSTOLIC BLOOD PRESSURE: 105 MMHG | DIASTOLIC BLOOD PRESSURE: 61 MMHG

## 2020-06-20 VITALS — DIASTOLIC BLOOD PRESSURE: 53 MMHG | SYSTOLIC BLOOD PRESSURE: 142 MMHG

## 2020-06-20 VITALS — SYSTOLIC BLOOD PRESSURE: 111 MMHG | DIASTOLIC BLOOD PRESSURE: 48 MMHG

## 2020-06-20 LAB
EOSINOPHIL NFR BLD MANUAL: 1 % (ref 1–6)
ERYTHROCYTE [DISTWIDTH] IN BLOOD BY AUTOMATED COUNT: 14.4 % (ref 11–15.5)
HCT VFR BLD AUTO: 20.8 % (ref 36–48)
HCT VFR BLD AUTO: 27 % (ref 36–48)
IRON SATN MFR SERPL: 33.3 % (ref 22–44)
IRON SERPL-MCNC: 41 MCG/DL (ref 50–170)
LYMPHOCYTES NFR BLD MANUAL: 19 % (ref 22–44)
MANUAL DIF COMMENT BLD-IMP: (no result)
MCH RBC QN AUTO: 29.6 PG (ref 27–33)
MCHC RBC AUTO-ENTMCNC: 32.6 G/DL (ref 32–36)
MCV RBC AUTO: 90.9 FL (ref 79–99)
MONOCYTES NFR BLD MANUAL: 4 % (ref 2–9)
NEUTS BAND NFR BLD MANUAL: 1 % (ref 0–2)
NEUTS SEG NFR BLD MANUAL: 75 % (ref 40–70)
NRBC BLD MANUAL-RTO: 0 % (ref 0–0.19)
PLAT MORPH BLD: ADEQUATE
PLATELET # BLD AUTO: 239 K/UL (ref 130–400)
RBC # BLD AUTO: 2.97 MIL/UL (ref 4–5.5)
RBC MORPH BLD: (no result)
TIBC SERPL-MCNC: 123 MCG/DL (ref 250–450)
WBC # BLD AUTO: 8.6 K/UL (ref 4.8–10.8)

## 2020-06-20 RX ADMIN — Medication SCH MLS/HR: at 21:59

## 2020-06-20 RX ADMIN — GABAPENTIN SCH MG: 100 CAPSULE ORAL at 09:54

## 2020-06-20 RX ADMIN — GABAPENTIN SCH MG: 100 CAPSULE ORAL at 14:50

## 2020-06-20 RX ADMIN — GABAPENTIN SCH MG: 100 CAPSULE ORAL at 20:38

## 2020-06-20 RX ADMIN — SODIUM CHLORIDE SCH MG: 9 INJECTION, SOLUTION INTRAVENOUS at 10:02

## 2020-06-20 RX ADMIN — DONEPEZIL HYDROCHLORIDE SCH MG: 5 TABLET, FILM COATED ORAL at 20:37

## 2020-06-20 RX ADMIN — FAMOTIDINE SCH MG: 10 INJECTION INTRAVENOUS at 09:54

## 2020-06-20 RX ADMIN — Medication SCH MLS/HR: at 10:02

## 2020-06-20 RX ADMIN — INSULIN LISPRO SCH UNIT: 100 INJECTION, SOLUTION INTRAVENOUS; SUBCUTANEOUS at 11:30

## 2020-06-20 RX ADMIN — INSULIN LISPRO SCH UNIT: 100 INJECTION, SOLUTION INTRAVENOUS; SUBCUTANEOUS at 20:37

## 2020-06-20 RX ADMIN — INSULIN LISPRO SCH UNIT: 100 INJECTION, SOLUTION INTRAVENOUS; SUBCUTANEOUS at 16:30

## 2020-06-20 RX ADMIN — INSULIN LISPRO SCH UNIT: 100 INJECTION, SOLUTION INTRAVENOUS; SUBCUTANEOUS at 17:57

## 2020-06-20 RX ADMIN — INSULIN LISPRO SCH UNIT: 100 INJECTION, SOLUTION INTRAVENOUS; SUBCUTANEOUS at 10:13

## 2020-06-20 RX ADMIN — SODIUM CHLORIDE SCH MG: 9 INJECTION, SOLUTION INTRAVENOUS at 17:59

## 2020-06-20 RX ADMIN — FLUCONAZOLE SCH MG: 100 TABLET ORAL at 14:50

## 2020-06-20 RX ADMIN — INSULIN LISPRO SCH UNIT: 100 INJECTION, SOLUTION INTRAVENOUS; SUBCUTANEOUS at 06:34

## 2020-06-20 RX ADMIN — SODIUM CHLORIDE SCH GM: 9 INJECTION, SOLUTION INTRAVENOUS at 14:50

## 2020-06-20 RX ADMIN — INSULIN LISPRO SCH UNIT: 100 INJECTION, SOLUTION INTRAVENOUS; SUBCUTANEOUS at 14:52

## 2020-06-20 NOTE — NUR
PATIENT UPDATE

Pt's H/H THIS AM AT 6.7/ 20, no bleeding noted externally. With hx of vaginal bleeding fr 
before accdg to pt but none noted at this time. Was on the continous bladder for 48 hrs, 
urine pale yellow, no trace of any pinkish tinged urine at all. Urine had been very clear 
since the garcia was changed and started on the CBI with NS. Irrigation just ran very slow. 
Blood pressure overnight had been in the 90's mmhg systolic and 40's to 50's mmhg diastolic, 
pt denies any lightheadedness. Mario Dangelo NP called and was made aware of the latest lab 
results with order received to transfuse 1 unit of prbc and have 1 unit on hold . Consent 
secured, type and screen ordered for the patient.

## 2020-06-20 NOTE — NUR
CONSULTED HEMATOLOGY AS PER DR SINGH.

SPOKE WITH ANSWERING SERVICE, DR HULL ON CALL. PENDING CALL BACK.

## 2020-06-20 NOTE — NUR
AS PER MD ORDERS 1 UNIT OF PRBC GIVEN TO PT: 

CHECKED AND VERIFIED UNIT WITH VIRAJ ROSS. IDENTIFIED PT NAME AND DATE OF BIRTH. 
MARTHA CROSS MATCHED UNIT OF BLOOD AND BLOOD FORM. ONCE VERIFIED, PROCEEDED WITH 
ADMINISTERING BLOOD UNIT AS PER MD ORDER. 

MONITORED PT FOR 15 MIN, NO SIGNS OF REACTION TO BLOOD NOTED. VITALS STABLE. PT DENIES ANY 
DISCOMFORT AND IN NO SIGNS OF DISTRESS. WILL CONTINUE TO MONITOR.

## 2020-06-20 NOTE — NUR
CM Note: [facility]  approval

CM received call from Gayathri NIX, pt had approval for [facility] yesterday. Pt safe 
to dc via private car once MD clear. Primary nurse aware. CM to cont to follow up.

## 2020-06-21 VITALS — SYSTOLIC BLOOD PRESSURE: 148 MMHG | DIASTOLIC BLOOD PRESSURE: 50 MMHG

## 2020-06-21 VITALS — DIASTOLIC BLOOD PRESSURE: 77 MMHG | SYSTOLIC BLOOD PRESSURE: 176 MMHG

## 2020-06-21 VITALS — DIASTOLIC BLOOD PRESSURE: 82 MMHG | SYSTOLIC BLOOD PRESSURE: 168 MMHG

## 2020-06-21 VITALS — DIASTOLIC BLOOD PRESSURE: 55 MMHG | SYSTOLIC BLOOD PRESSURE: 145 MMHG

## 2020-06-21 VITALS — SYSTOLIC BLOOD PRESSURE: 166 MMHG | DIASTOLIC BLOOD PRESSURE: 70 MMHG

## 2020-06-21 VITALS — DIASTOLIC BLOOD PRESSURE: 59 MMHG | SYSTOLIC BLOOD PRESSURE: 162 MMHG

## 2020-06-21 LAB
ALBUMIN SERPL-MCNC: 2 G/DL (ref 3.5–5)
ALT SERPL-CCNC: 20 U/L (ref 12–78)
AST SERPL-CCNC: 27 U/L (ref 10–37)
BASOPHILS NFR BLD AUTO: 0.2 % (ref 0–5)
BILIRUB SERPL-MCNC: 0.1 MG/DL (ref 0.2–1)
BUN SERPL-MCNC: 22 MG/DL (ref 7–18)
CHLORIDE SERPL-SCNC: 108 MMOL/L (ref 101–111)
CO2 SERPL-SCNC: 28 MMOL/L (ref 21–32)
CREAT SERPL-MCNC: 1.1 MG/DL (ref 0.5–1.5)
EOSINOPHIL NFR BLD AUTO: 2.2 % (ref 0–8)
ERYTHROCYTE [DISTWIDTH] IN BLOOD BY AUTOMATED COUNT: 14.6 % (ref 11–15.5)
GFR SERPL CREATININE-BSD FRML MDRD: 52 ML/MIN (ref 60–?)
GLUCOSE SERPL-MCNC: 213 MG/DL (ref 70–105)
HCT VFR BLD AUTO: 28.1 % (ref 36–48)
LYMPHOCYTES NFR SPEC AUTO: 19.7 % (ref 21–51)
MCH RBC QN AUTO: 29.6 PG (ref 27–33)
MCHC RBC AUTO-ENTMCNC: 32.7 G/DL (ref 32–36)
MCV RBC AUTO: 90.4 FL (ref 79–99)
MONOCYTES NFR BLD AUTO: 6.6 % (ref 3–13)
NEUTROPHILS NFR BLD AUTO: 70.1 % (ref 40–77)
NRBC BLD MANUAL-RTO: 0 % (ref 0–0.19)
PLATELET # BLD AUTO: 262 K/UL (ref 130–400)
POTASSIUM SERPL-SCNC: 3.9 MMOL/L (ref 3.5–5.1)
PROT SERPL-MCNC: 5.4 G/DL (ref 6–8.3)
RBC # BLD AUTO: 3.11 MIL/UL (ref 4–5.5)
SODIUM SERPL-SCNC: 141 MMOL/L (ref 136–145)
WBC # BLD AUTO: 8.2 K/UL (ref 4.8–10.8)

## 2020-06-21 PROCEDURE — 30233N1 TRANSFUSION OF NONAUTOLOGOUS RED BLOOD CELLS INTO PERIPHERAL VEIN, PERCUTANEOUS APPROACH: ICD-10-PCS | Performed by: INTERNAL MEDICINE

## 2020-06-21 RX ADMIN — Medication PRN MG: at 21:47

## 2020-06-21 RX ADMIN — INSULIN LISPRO SCH UNIT: 100 INJECTION, SOLUTION INTRAVENOUS; SUBCUTANEOUS at 11:30

## 2020-06-21 RX ADMIN — INSULIN LISPRO SCH UNIT: 100 INJECTION, SOLUTION INTRAVENOUS; SUBCUTANEOUS at 08:24

## 2020-06-21 RX ADMIN — GABAPENTIN SCH MG: 100 CAPSULE ORAL at 14:31

## 2020-06-21 RX ADMIN — INSULIN LISPRO SCH UNIT: 100 INJECTION, SOLUTION INTRAVENOUS; SUBCUTANEOUS at 20:53

## 2020-06-21 RX ADMIN — INSULIN LISPRO SCH UNIT: 100 INJECTION, SOLUTION INTRAVENOUS; SUBCUTANEOUS at 13:04

## 2020-06-21 RX ADMIN — SODIUM CHLORIDE SCH GM: 9 INJECTION, SOLUTION INTRAVENOUS at 12:57

## 2020-06-21 RX ADMIN — INSULIN LISPRO SCH UNIT: 100 INJECTION, SOLUTION INTRAVENOUS; SUBCUTANEOUS at 16:38

## 2020-06-21 RX ADMIN — DONEPEZIL HYDROCHLORIDE SCH MG: 5 TABLET, FILM COATED ORAL at 20:54

## 2020-06-21 RX ADMIN — FLUCONAZOLE SCH MG: 100 TABLET ORAL at 14:27

## 2020-06-21 RX ADMIN — SODIUM CHLORIDE SCH MG: 9 INJECTION, SOLUTION INTRAVENOUS at 05:23

## 2020-06-21 RX ADMIN — GABAPENTIN SCH MG: 100 CAPSULE ORAL at 20:55

## 2020-06-21 RX ADMIN — SODIUM CHLORIDE SCH MG: 9 INJECTION, SOLUTION INTRAVENOUS at 16:33

## 2020-06-21 RX ADMIN — INSULIN LISPRO SCH UNIT: 100 INJECTION, SOLUTION INTRAVENOUS; SUBCUTANEOUS at 17:00

## 2020-06-21 RX ADMIN — FAMOTIDINE SCH MG: 10 INJECTION INTRAVENOUS at 10:27

## 2020-06-21 RX ADMIN — GABAPENTIN SCH MG: 100 CAPSULE ORAL at 10:28

## 2020-06-21 RX ADMIN — INSULIN LISPRO SCH UNIT: 100 INJECTION, SOLUTION INTRAVENOUS; SUBCUTANEOUS at 05:23

## 2020-06-21 NOTE — NUR
AM SHIFT ASSESSMENT:CBI STILL IN PROGRESS, WILL ASK  IF IT CAN BE STOPPED, URINE 
HAS BEEN SUPER CLEAR, PALE YELLOW COLOR

## 2020-06-21 NOTE — NUR
CONTINUED WITH CONTINOUS BLADDER IRRIGATION WITH 500 ML OF NORMAL SALINE AND OUTPUT OF 1,900 
ML. TOTAL URINE OUTPUT IS 1,400 ML.

## 2020-06-21 NOTE — NUR
C/O OF PAIN TO LT UPPER QUAD. UPON CHECKING TO APPLY LIDODERM PATCH NOTED PT TO HAVE RASH 
WITH PUSTULES, AND FOLLOWED A NERVE PATTERN. NOTIFIED D

## 2020-06-22 VITALS — DIASTOLIC BLOOD PRESSURE: 72 MMHG | SYSTOLIC BLOOD PRESSURE: 146 MMHG

## 2020-06-22 VITALS — SYSTOLIC BLOOD PRESSURE: 160 MMHG | DIASTOLIC BLOOD PRESSURE: 73 MMHG

## 2020-06-22 VITALS — SYSTOLIC BLOOD PRESSURE: 155 MMHG | DIASTOLIC BLOOD PRESSURE: 75 MMHG

## 2020-06-22 VITALS — DIASTOLIC BLOOD PRESSURE: 64 MMHG | SYSTOLIC BLOOD PRESSURE: 116 MMHG

## 2020-06-22 VITALS — DIASTOLIC BLOOD PRESSURE: 87 MMHG | SYSTOLIC BLOOD PRESSURE: 167 MMHG

## 2020-06-22 VITALS — DIASTOLIC BLOOD PRESSURE: 86 MMHG | SYSTOLIC BLOOD PRESSURE: 168 MMHG

## 2020-06-22 LAB
HCT VFR BLD AUTO: 31.2 % (ref 36–48)
IRON SATN MFR SERPL: 36 % (ref 22–44)
IRON SERPL-MCNC: 62 MCG/DL (ref 50–170)
TIBC SERPL-MCNC: 172 MCG/DL (ref 250–450)

## 2020-06-22 RX ADMIN — GABAPENTIN SCH MG: 100 CAPSULE ORAL at 10:50

## 2020-06-22 RX ADMIN — VALACYCLOVIR HYDROCHLORIDE SCH MG: 500 TABLET, FILM COATED ORAL at 21:57

## 2020-06-22 RX ADMIN — ACETAMINOPHEN PRN MG: 325 TABLET, FILM COATED ORAL at 10:49

## 2020-06-22 RX ADMIN — INSULIN LISPRO SCH UNIT: 100 INJECTION, SOLUTION INTRAVENOUS; SUBCUTANEOUS at 21:00

## 2020-06-22 RX ADMIN — FAMOTIDINE SCH MG: 10 INJECTION INTRAVENOUS at 10:50

## 2020-06-22 RX ADMIN — GABAPENTIN SCH MG: 100 CAPSULE ORAL at 22:00

## 2020-06-22 RX ADMIN — INSULIN LISPRO SCH UNIT: 100 INJECTION, SOLUTION INTRAVENOUS; SUBCUTANEOUS at 17:07

## 2020-06-22 RX ADMIN — VALACYCLOVIR HYDROCHLORIDE SCH MG: 500 TABLET, FILM COATED ORAL at 10:47

## 2020-06-22 RX ADMIN — INSULIN LISPRO SCH UNIT: 100 INJECTION, SOLUTION INTRAVENOUS; SUBCUTANEOUS at 13:28

## 2020-06-22 RX ADMIN — INSULIN LISPRO SCH UNIT: 100 INJECTION, SOLUTION INTRAVENOUS; SUBCUTANEOUS at 05:57

## 2020-06-22 RX ADMIN — INSULIN LISPRO SCH UNIT: 100 INJECTION, SOLUTION INTRAVENOUS; SUBCUTANEOUS at 05:58

## 2020-06-22 RX ADMIN — PANTOPRAZOLE SODIUM SCH MG: 40 TABLET, DELAYED RELEASE ORAL at 10:51

## 2020-06-22 RX ADMIN — INSULIN LISPRO SCH UNIT: 100 INJECTION, SOLUTION INTRAVENOUS; SUBCUTANEOUS at 17:08

## 2020-06-22 RX ADMIN — GABAPENTIN SCH MG: 100 CAPSULE ORAL at 13:33

## 2020-06-22 RX ADMIN — Medication PRN MG: at 21:58

## 2020-06-22 RX ADMIN — INSULIN GLARGINE SCH UNITS: 100 INJECTION, SOLUTION SUBCUTANEOUS at 13:15

## 2020-06-22 RX ADMIN — FLUCONAZOLE SCH MG: 100 TABLET ORAL at 13:49

## 2020-06-22 RX ADMIN — SODIUM CHLORIDE SCH MG: 9 INJECTION, SOLUTION INTRAVENOUS at 06:21

## 2020-06-22 RX ADMIN — INSULIN LISPRO SCH UNIT: 100 INJECTION, SOLUTION INTRAVENOUS; SUBCUTANEOUS at 13:32

## 2020-06-22 RX ADMIN — SODIUM CHLORIDE SCH GM: 9 INJECTION, SOLUTION INTRAVENOUS at 13:26

## 2020-06-22 RX ADMIN — VALACYCLOVIR HYDROCHLORIDE SCH MG: 500 TABLET, FILM COATED ORAL at 13:33

## 2020-06-22 RX ADMIN — PANTOPRAZOLE SODIUM SCH MG: 40 TABLET, DELAYED RELEASE ORAL at 17:06

## 2020-06-22 RX ADMIN — ACETAMINOPHEN PRN MG: 325 TABLET, FILM COATED ORAL at 22:00

## 2020-06-22 RX ADMIN — DONEPEZIL HYDROCHLORIDE SCH MG: 5 TABLET, FILM COATED ORAL at 22:00

## 2020-06-22 NOTE — NUR
CALL DAUGHTER

T/C PLACED TO DAUGHTER, PRAVIN, NO ANSWER LEFT MESSAGE TO CALL PRIMARY. NO OTHER PHONE LISTED. 
INFORMED PRIMARY DICK THAT I CALLED AND INSTRUCTED IN THE MESSAGE TO CALL YOUR SPECTRA

## 2020-06-22 NOTE — NUR
spoke with gianna landon on patient's continous high systolic blood pressures in the 
160's. no knew orders. 

'

## 2020-06-22 NOTE — NUR
AS PER PT FAMILY DOES NOT ANSWER HER CALLS

NO TRANSPORTATION HOME. PAGED HOSPITALIST ON CALL. TO NOTIFY.

## 2020-06-23 VITALS — SYSTOLIC BLOOD PRESSURE: 132 MMHG | DIASTOLIC BLOOD PRESSURE: 66 MMHG

## 2020-06-23 VITALS — DIASTOLIC BLOOD PRESSURE: 68 MMHG | SYSTOLIC BLOOD PRESSURE: 144 MMHG

## 2020-06-23 VITALS — SYSTOLIC BLOOD PRESSURE: 134 MMHG | DIASTOLIC BLOOD PRESSURE: 55 MMHG

## 2020-06-23 VITALS — SYSTOLIC BLOOD PRESSURE: 158 MMHG | DIASTOLIC BLOOD PRESSURE: 84 MMHG

## 2020-06-23 VITALS — DIASTOLIC BLOOD PRESSURE: 55 MMHG | SYSTOLIC BLOOD PRESSURE: 134 MMHG

## 2020-06-23 RX ADMIN — FLUCONAZOLE SCH MG: 100 TABLET ORAL at 13:47

## 2020-06-23 RX ADMIN — VALACYCLOVIR HYDROCHLORIDE SCH MG: 500 TABLET, FILM COATED ORAL at 08:35

## 2020-06-23 RX ADMIN — VALACYCLOVIR HYDROCHLORIDE SCH MG: 500 TABLET, FILM COATED ORAL at 13:46

## 2020-06-23 RX ADMIN — SODIUM CHLORIDE SCH GM: 9 INJECTION, SOLUTION INTRAVENOUS at 13:05

## 2020-06-23 RX ADMIN — INSULIN LISPRO SCH UNIT: 100 INJECTION, SOLUTION INTRAVENOUS; SUBCUTANEOUS at 16:30

## 2020-06-23 RX ADMIN — GABAPENTIN SCH MG: 100 CAPSULE ORAL at 13:47

## 2020-06-23 RX ADMIN — ACETAMINOPHEN PRN MG: 325 TABLET, FILM COATED ORAL at 05:28

## 2020-06-23 RX ADMIN — INSULIN LISPRO SCH UNIT: 100 INJECTION, SOLUTION INTRAVENOUS; SUBCUTANEOUS at 11:30

## 2020-06-23 RX ADMIN — INSULIN LISPRO SCH UNIT: 100 INJECTION, SOLUTION INTRAVENOUS; SUBCUTANEOUS at 06:50

## 2020-06-23 RX ADMIN — INSULIN LISPRO SCH UNIT: 100 INJECTION, SOLUTION INTRAVENOUS; SUBCUTANEOUS at 11:51

## 2020-06-23 RX ADMIN — FAMOTIDINE SCH MG: 10 INJECTION INTRAVENOUS at 08:34

## 2020-06-23 RX ADMIN — PANTOPRAZOLE SODIUM SCH MG: 40 TABLET, DELAYED RELEASE ORAL at 06:50

## 2020-06-23 RX ADMIN — GABAPENTIN SCH MG: 100 CAPSULE ORAL at 08:35

## 2020-06-23 RX ADMIN — INSULIN GLARGINE SCH UNITS: 100 INJECTION, SOLUTION SUBCUTANEOUS at 08:11

## 2020-06-23 RX ADMIN — PANTOPRAZOLE SODIUM SCH MG: 40 TABLET, DELAYED RELEASE ORAL at 16:06

## 2020-06-23 RX ADMIN — INSULIN LISPRO SCH UNIT: 100 INJECTION, SOLUTION INTRAVENOUS; SUBCUTANEOUS at 06:51

## 2020-06-23 NOTE — NUR
[facility]



REPORT GIVEN TO NURSE VELEZ OF [facility] [phone]. INFORMED PATIENT TO BE 
DISCHARGED HOME TODAY.

## 2020-06-23 NOTE — NUR
CALL TO DAUGHTER PRAVIN ADAM.



 STATES SHE WAS OUT OF TOWN- PROBLEMS WITH HER PHONE, 

GOT BACK LATE YESTERDAY , ANTICIPATING DISCHAGE TODAY.

 EXPLAINED TO ARON THAT PT HAS A TEST GOING UNTIL 6 PM 

AND NURSE WILL CALL AFTER THAT

-------------------------------------------------------------------------------

Addendum: 06/23/20 at 1035 by CORONA MCCLOUD RN CM

-------------------------------------------------------------------------------

Amended: Links added.

## 2020-07-14 ENCOUNTER — HOSPITAL ENCOUNTER (INPATIENT)
Dept: HOSPITAL 90 - EDH | Age: 70
LOS: 7 days | Discharge: HOME | DRG: 690 | End: 2020-07-21
Attending: INTERNAL MEDICINE | Admitting: INTERNAL MEDICINE
Payer: MEDICARE

## 2020-07-14 VITALS — WEIGHT: 98.6 LBS | BODY MASS INDEX: 16.83 KG/M2 | HEIGHT: 64 IN

## 2020-07-14 DIAGNOSIS — N18.2: ICD-10-CM

## 2020-07-14 DIAGNOSIS — Z79.4: ICD-10-CM

## 2020-07-14 DIAGNOSIS — D63.8: ICD-10-CM

## 2020-07-14 DIAGNOSIS — G30.9: ICD-10-CM

## 2020-07-14 DIAGNOSIS — B96.20: ICD-10-CM

## 2020-07-14 DIAGNOSIS — I49.1: ICD-10-CM

## 2020-07-14 DIAGNOSIS — E78.5: ICD-10-CM

## 2020-07-14 DIAGNOSIS — E87.2: ICD-10-CM

## 2020-07-14 DIAGNOSIS — N17.9: ICD-10-CM

## 2020-07-14 DIAGNOSIS — I13.0: ICD-10-CM

## 2020-07-14 DIAGNOSIS — E86.0: ICD-10-CM

## 2020-07-14 DIAGNOSIS — Z20.828: ICD-10-CM

## 2020-07-14 DIAGNOSIS — Z79.899: ICD-10-CM

## 2020-07-14 DIAGNOSIS — I50.32: ICD-10-CM

## 2020-07-14 DIAGNOSIS — Z91.19: ICD-10-CM

## 2020-07-14 DIAGNOSIS — E11.22: ICD-10-CM

## 2020-07-14 DIAGNOSIS — N10: ICD-10-CM

## 2020-07-14 DIAGNOSIS — F02.80: ICD-10-CM

## 2020-07-14 DIAGNOSIS — N39.0: Primary | ICD-10-CM

## 2020-07-14 LAB
ALBUMIN SERPL-MCNC: 3.8 G/DL (ref 3.5–5)
ALT SERPL-CCNC: 16 U/L (ref 12–78)
AMPHETAMINES UR QL SCN: NEGATIVE
APTT PPP: 23.1 SEC (ref 26.3–35.5)
AST SERPL-CCNC: 12 U/L (ref 10–37)
BARBITURATES UR QL SCN: NEGATIVE
BASOPHILS NFR BLD AUTO: 0.7 % (ref 0–5)
BENZODIAZ UR QL SCN: NEGATIVE
BILIRUB SERPL-MCNC: 0.6 MG/DL (ref 0.2–1)
BUN SERPL-MCNC: 45 MG/DL (ref 7–18)
BZE UR QL SCN: NEGATIVE
CANNABINOIDS UR QL SCN: NEGATIVE
CHLORIDE SERPL-SCNC: 97 MMOL/L (ref 101–111)
CK SERPL-CCNC: 18 U/L (ref 21–232)
CO2 SERPL-SCNC: 24 MMOL/L (ref 21–32)
CREAT SERPL-MCNC: 2.6 MG/DL (ref 0.5–1.5)
EOSINOPHIL NFR BLD AUTO: 3.1 % (ref 0–8)
ERYTHROCYTE [DISTWIDTH] IN BLOOD BY AUTOMATED COUNT: 14.3 % (ref 11–15.5)
GFR SERPL CREATININE-BSD FRML MDRD: 19 ML/MIN (ref 60–?)
GLUCOSE SERPL-MCNC: 197 MG/DL (ref 70–105)
GLUCOSE UR STRIP-MCNC: >=1000 MG/DL
HCT VFR BLD AUTO: 33.6 % (ref 36–48)
INR PPP: 0.88 (ref 0.85–1.15)
LYMPHOCYTES NFR SPEC AUTO: 34.9 % (ref 21–51)
MCH RBC QN AUTO: 29.9 PG (ref 27–33)
MCHC RBC AUTO-ENTMCNC: 31.5 G/DL (ref 32–36)
MCV RBC AUTO: 94.6 FL (ref 79–99)
MONOCYTES NFR BLD AUTO: 5.8 % (ref 3–13)
MYOGLOBIN SERPL-MCNC: 46 NG/ML (ref 10–92)
NEUTROPHILS NFR BLD AUTO: 55.2 % (ref 40–77)
NRBC BLD MANUAL-RTO: 0 % (ref 0–0.19)
OPIATES UR QL SCN: NEGATIVE
PCP UR QL SCN: NEGATIVE
PH UR STRIP: 6 [PH] (ref 5–8)
PLATELET # BLD AUTO: 266 K/UL (ref 130–400)
POTASSIUM SERPL-SCNC: 4 MMOL/L (ref 3.5–5.1)
PROT SERPL-MCNC: 7.7 G/DL (ref 6–8.3)
PROT UR QL STRIP: (no result) MG/DL
PROTHROMBIN TIME: 9.6 SEC (ref 9.6–11.6)
RBC # BLD AUTO: 3.55 MIL/UL (ref 4–5.5)
RBC #/AREA URNS HPF: (no result) /HPF (ref 0–1)
SODIUM SERPL-SCNC: 135 MMOL/L (ref 136–145)
SP GR UR STRIP: 1.01 (ref 1–1.03)
TROPONIN I SERPL-MCNC: < 0.04 NG/ML (ref 0–0.06)
UROBILINOGEN UR STRIP-MCNC: 1 MG/DL (ref 0.2–1)
WBC # BLD AUTO: 7.5 K/UL (ref 4.8–10.8)
WBC #/AREA URNS HPF: (no result) /HPF (ref 0–1)

## 2020-07-14 PROCEDURE — 84145 PROCALCITONIN (PCT): CPT

## 2020-07-14 PROCEDURE — 87077 CULTURE AEROBIC IDENTIFY: CPT

## 2020-07-14 PROCEDURE — 83735 ASSAY OF MAGNESIUM: CPT

## 2020-07-14 PROCEDURE — 85025 COMPLETE CBC W/AUTO DIFF WBC: CPT

## 2020-07-14 PROCEDURE — 82550 ASSAY OF CK (CPK): CPT

## 2020-07-14 PROCEDURE — 93005 ELECTROCARDIOGRAM TRACING: CPT

## 2020-07-14 PROCEDURE — 36415 COLL VENOUS BLD VENIPUNCTURE: CPT

## 2020-07-14 PROCEDURE — 80048 BASIC METABOLIC PNL TOTAL CA: CPT

## 2020-07-14 PROCEDURE — 87088 URINE BACTERIA CULTURE: CPT

## 2020-07-14 PROCEDURE — 80305 DRUG TEST PRSMV DIR OPT OBS: CPT

## 2020-07-14 PROCEDURE — 84484 ASSAY OF TROPONIN QUANT: CPT

## 2020-07-14 PROCEDURE — 81001 URINALYSIS AUTO W/SCOPE: CPT

## 2020-07-14 PROCEDURE — 83605 ASSAY OF LACTIC ACID: CPT

## 2020-07-14 PROCEDURE — 80053 COMPREHEN METABOLIC PANEL: CPT

## 2020-07-14 PROCEDURE — 85027 COMPLETE CBC AUTOMATED: CPT

## 2020-07-14 PROCEDURE — 83874 ASSAY OF MYOGLOBIN: CPT

## 2020-07-14 PROCEDURE — 97039 UNLISTED MODALITY: CPT

## 2020-07-14 PROCEDURE — 82948 REAGENT STRIP/BLOOD GLUCOSE: CPT

## 2020-07-14 PROCEDURE — 93356 MYOCRD STRAIN IMG SPCKL TRCK: CPT

## 2020-07-14 PROCEDURE — 93306 TTE W/DOPPLER COMPLETE: CPT

## 2020-07-14 PROCEDURE — 71045 X-RAY EXAM CHEST 1 VIEW: CPT

## 2020-07-14 PROCEDURE — 85730 THROMBOPLASTIN TIME PARTIAL: CPT

## 2020-07-14 PROCEDURE — 87040 BLOOD CULTURE FOR BACTERIA: CPT

## 2020-07-14 PROCEDURE — 87186 SC STD MICRODIL/AGAR DIL: CPT

## 2020-07-14 PROCEDURE — 86900 BLOOD TYPING SEROLOGIC ABO: CPT

## 2020-07-14 PROCEDURE — 85610 PROTHROMBIN TIME: CPT

## 2020-07-14 PROCEDURE — 86901 BLOOD TYPING SEROLOGIC RH(D): CPT

## 2020-07-15 VITALS — DIASTOLIC BLOOD PRESSURE: 79 MMHG | SYSTOLIC BLOOD PRESSURE: 180 MMHG

## 2020-07-15 VITALS — DIASTOLIC BLOOD PRESSURE: 69 MMHG | SYSTOLIC BLOOD PRESSURE: 161 MMHG

## 2020-07-15 LAB
ALBUMIN SERPL-MCNC: 2.8 G/DL (ref 3.5–5)
ALT SERPL-CCNC: 11 U/L (ref 12–78)
AST SERPL-CCNC: 9 U/L (ref 10–37)
BILIRUB SERPL-MCNC: 0.2 MG/DL (ref 0.2–1)
BUN SERPL-MCNC: 33 MG/DL (ref 7–18)
CHLORIDE SERPL-SCNC: 106 MMOL/L (ref 101–111)
CO2 SERPL-SCNC: 26 MMOL/L (ref 21–32)
CREAT SERPL-MCNC: 1.5 MG/DL (ref 0.5–1.5)
EOSINOPHIL NFR BLD MANUAL: 2 % (ref 1–6)
ERYTHROCYTE [DISTWIDTH] IN BLOOD BY AUTOMATED COUNT: 13.9 % (ref 11–15.5)
GFR SERPL CREATININE-BSD FRML MDRD: 36 ML/MIN (ref 60–?)
GLUCOSE SERPL-MCNC: 218 MG/DL (ref 70–105)
HCT VFR BLD AUTO: 29.9 % (ref 36–48)
LYMPHOCYTES NFR BLD MANUAL: 22 % (ref 22–44)
MAGNESIUM SERPL-MCNC: 2 MG/DL (ref 1.8–2.4)
MANUAL DIF COMMENT BLD-IMP: (no result)
MCH RBC QN AUTO: 29.9 PG (ref 27–33)
MCHC RBC AUTO-ENTMCNC: 31.8 G/DL (ref 32–36)
MCV RBC AUTO: 94 FL (ref 79–99)
MONOCYTES NFR BLD MANUAL: 6 % (ref 2–9)
NEUTS BAND NFR BLD MANUAL: 2 % (ref 0–2)
NEUTS SEG NFR BLD MANUAL: 68 % (ref 40–70)
NRBC BLD MANUAL-RTO: 0 % (ref 0–0.19)
PLAT MORPH BLD: ADEQUATE
PLATELET # BLD AUTO: 218 K/UL (ref 130–400)
POTASSIUM SERPL-SCNC: 3.5 MMOL/L (ref 3.5–5.1)
PROT SERPL-MCNC: 6.3 G/DL (ref 6–8.3)
RBC # BLD AUTO: 3.18 MIL/UL (ref 4–5.5)
RBC MORPH BLD: (no result)
SODIUM SERPL-SCNC: 141 MMOL/L (ref 136–145)
WBC # BLD AUTO: 6.2 K/UL (ref 4.8–10.8)

## 2020-07-15 RX ADMIN — SODIUM CHLORIDE SCH UNIT: 9 INJECTION, SOLUTION INTRAVENOUS at 21:00

## 2020-07-15 RX ADMIN — SODIUM CHLORIDE SCH GM: 9 INJECTION, SOLUTION INTRAVENOUS at 09:00

## 2020-07-15 RX ADMIN — SODIUM CHLORIDE SCH UNIT: 9 INJECTION, SOLUTION INTRAVENOUS at 07:30

## 2020-07-15 RX ADMIN — ENOXAPARIN SODIUM SCH MG: 30 INJECTION SUBCUTANEOUS at 09:00

## 2020-07-15 RX ADMIN — SODIUM CHLORIDE SCH UNIT: 9 INJECTION, SOLUTION INTRAVENOUS at 16:30

## 2020-07-15 RX ADMIN — FAMOTIDINE SCH MG: 20 TABLET, FILM COATED ORAL at 09:00

## 2020-07-15 RX ADMIN — SODIUM CHLORIDE SCH UNIT: 9 INJECTION, SOLUTION INTRAVENOUS at 11:30

## 2020-07-15 NOTE — NUR
Cardiology Consult



Informed by ER unit secretary Manuela that [facility] aware of consult.

## 2020-07-15 NOTE — NUR
CHART CHECK COMPLETED. 



Pt IS A 70 Y.O. FEMALE ADMITTED SECONDARY TO HYPOTENSION, BRADYCARDIA, LACTIC ACIDOSIS, UTI. 
Pt HAS A PAST MEDICAL HISTORY SIGNIFICANT FOR CHF,DMII, DKA, ACUTE KIDNEY INJURY, 
HYPERTENSION, PANCREATITIS, SEPSIS, UTI, ANEMIA, HYDRONEPHROSIS, SHINGLES. Pt CURRENTLY ON A 
HEART HEALTHY DIET (REGULAR TEXTURE, THIN LIQUIDS). PLEASE REQUEST A FORMAL SKILLED 
SPEECH/SWALLOW EVALUATION IF Pt PRESENTS WITH DIFFICULTY SWALLOWING OR S/S OF ASPIRATION. 

-------------------------------------------------------------------------------

Addendum: 07/15/20 at 1223 by AAMIR MARX [facility]

-------------------------------------------------------------------------------

Amended: Links added.

## 2020-07-16 VITALS — DIASTOLIC BLOOD PRESSURE: 71 MMHG | SYSTOLIC BLOOD PRESSURE: 173 MMHG

## 2020-07-16 VITALS — DIASTOLIC BLOOD PRESSURE: 71 MMHG | SYSTOLIC BLOOD PRESSURE: 155 MMHG

## 2020-07-16 VITALS — SYSTOLIC BLOOD PRESSURE: 126 MMHG | DIASTOLIC BLOOD PRESSURE: 51 MMHG

## 2020-07-16 VITALS — DIASTOLIC BLOOD PRESSURE: 91 MMHG | SYSTOLIC BLOOD PRESSURE: 183 MMHG

## 2020-07-16 LAB
BUN SERPL-MCNC: 18 MG/DL (ref 7–18)
CHLORIDE SERPL-SCNC: 106 MMOL/L (ref 101–111)
CO2 SERPL-SCNC: 25 MMOL/L (ref 21–32)
CREAT SERPL-MCNC: 1.1 MG/DL (ref 0.5–1.5)
ERYTHROCYTE [DISTWIDTH] IN BLOOD BY AUTOMATED COUNT: 13.7 % (ref 11–15.5)
GFR SERPL CREATININE-BSD FRML MDRD: 52 ML/MIN (ref 60–?)
GLUCOSE SERPL-MCNC: 227 MG/DL (ref 70–105)
HCT VFR BLD AUTO: 33.8 % (ref 36–48)
MCH RBC QN AUTO: 30.5 PG (ref 27–33)
MCHC RBC AUTO-ENTMCNC: 32.8 G/DL (ref 32–36)
MCV RBC AUTO: 92.9 FL (ref 79–99)
NRBC BLD MANUAL-RTO: 0 % (ref 0–0.19)
PLATELET # BLD AUTO: 230 K/UL (ref 130–400)
POTASSIUM SERPL-SCNC: 3.6 MMOL/L (ref 3.5–5.1)
RBC # BLD AUTO: 3.64 MIL/UL (ref 4–5.5)
SODIUM SERPL-SCNC: 141 MMOL/L (ref 136–145)
WBC # BLD AUTO: 6.1 K/UL (ref 4.8–10.8)

## 2020-07-16 RX ADMIN — ENOXAPARIN SODIUM SCH MG: 30 INJECTION SUBCUTANEOUS at 10:30

## 2020-07-16 RX ADMIN — NIFEDIPINE SCH MG: 30 TABLET, FILM COATED, EXTENDED RELEASE ORAL at 21:00

## 2020-07-16 RX ADMIN — SODIUM CHLORIDE SCH UNIT: 9 INJECTION, SOLUTION INTRAVENOUS at 11:30

## 2020-07-16 RX ADMIN — SODIUM CHLORIDE SCH UNIT: 9 INJECTION, SOLUTION INTRAVENOUS at 07:30

## 2020-07-16 RX ADMIN — SODIUM CHLORIDE SCH UNIT: 9 INJECTION, SOLUTION INTRAVENOUS at 16:30

## 2020-07-16 RX ADMIN — SODIUM CHLORIDE SCH UNIT: 9 INJECTION, SOLUTION INTRAVENOUS at 21:00

## 2020-07-16 RX ADMIN — SODIUM CHLORIDE SCH GM: 9 INJECTION, SOLUTION INTRAVENOUS at 11:00

## 2020-07-17 VITALS — DIASTOLIC BLOOD PRESSURE: 79 MMHG | SYSTOLIC BLOOD PRESSURE: 159 MMHG

## 2020-07-17 VITALS — DIASTOLIC BLOOD PRESSURE: 76 MMHG | SYSTOLIC BLOOD PRESSURE: 142 MMHG

## 2020-07-17 VITALS — SYSTOLIC BLOOD PRESSURE: 102 MMHG | DIASTOLIC BLOOD PRESSURE: 46 MMHG

## 2020-07-17 VITALS — SYSTOLIC BLOOD PRESSURE: 118 MMHG | DIASTOLIC BLOOD PRESSURE: 63 MMHG

## 2020-07-17 VITALS — SYSTOLIC BLOOD PRESSURE: 176 MMHG | DIASTOLIC BLOOD PRESSURE: 86 MMHG

## 2020-07-17 VITALS — SYSTOLIC BLOOD PRESSURE: 121 MMHG | DIASTOLIC BLOOD PRESSURE: 57 MMHG

## 2020-07-17 LAB
BUN SERPL-MCNC: 24 MG/DL (ref 7–18)
CHLORIDE SERPL-SCNC: 109 MMOL/L (ref 101–111)
CO2 SERPL-SCNC: 24 MMOL/L (ref 21–32)
CREAT SERPL-MCNC: 1.2 MG/DL (ref 0.5–1.5)
ERYTHROCYTE [DISTWIDTH] IN BLOOD BY AUTOMATED COUNT: 14.2 % (ref 11–15.5)
GFR SERPL CREATININE-BSD FRML MDRD: 47 ML/MIN (ref 60–?)
GLUCOSE SERPL-MCNC: 101 MG/DL (ref 70–105)
HCT VFR BLD AUTO: 31.1 % (ref 36–48)
MCH RBC QN AUTO: 30.8 PG (ref 27–33)
MCHC RBC AUTO-ENTMCNC: 32.5 G/DL (ref 32–36)
MCV RBC AUTO: 94.8 FL (ref 79–99)
NRBC BLD MANUAL-RTO: 0 % (ref 0–0.19)
PLATELET # BLD AUTO: 93 K/UL (ref 130–400)
POTASSIUM SERPL-SCNC: 4.1 MMOL/L (ref 3.5–5.1)
RBC # BLD AUTO: 3.28 MIL/UL (ref 4–5.5)
SODIUM SERPL-SCNC: 143 MMOL/L (ref 136–145)
WBC # BLD AUTO: 5.9 K/UL (ref 4.8–10.8)

## 2020-07-17 RX ADMIN — NIFEDIPINE SCH MG: 30 TABLET, FILM COATED, EXTENDED RELEASE ORAL at 21:13

## 2020-07-17 RX ADMIN — FAMOTIDINE SCH MG: 20 TABLET, FILM COATED ORAL at 08:32

## 2020-07-17 RX ADMIN — LISINOPRIL SCH MG: 5 TABLET ORAL at 13:26

## 2020-07-17 RX ADMIN — ENOXAPARIN SODIUM SCH MG: 30 INJECTION SUBCUTANEOUS at 08:32

## 2020-07-17 RX ADMIN — SODIUM CHLORIDE SCH UNIT: 9 INJECTION, SOLUTION INTRAVENOUS at 07:30

## 2020-07-17 RX ADMIN — SODIUM CHLORIDE SCH GM: 9 INJECTION, SOLUTION INTRAVENOUS at 08:32

## 2020-07-17 RX ADMIN — SODIUM CHLORIDE SCH UNIT: 9 INJECTION, SOLUTION INTRAVENOUS at 11:09

## 2020-07-17 RX ADMIN — SODIUM CHLORIDE SCH UNIT: 9 INJECTION, SOLUTION INTRAVENOUS at 21:00

## 2020-07-17 RX ADMIN — NIFEDIPINE SCH MG: 30 TABLET, FILM COATED, EXTENDED RELEASE ORAL at 08:37

## 2020-07-17 RX ADMIN — SODIUM CHLORIDE SCH UNIT: 9 INJECTION, SOLUTION INTRAVENOUS at 16:12

## 2020-07-17 NOTE — NUR
DR CARLSON MADE ROUNDS AT BEDSIDE. POSSIBLE DC HOME TOMORROW. PT DEBILITATED/WEAK. URINE 
STILL CLOUDY AND FOUL. INFORMED DR CARLSON THAT PT DECONDITIONED. PHYSICAL THERAPY DISCUSSED 

-------------------------------------------------------------------------------

Addendum: 07/17/20 at 1343 by MECHE SANTANA RN RN

-------------------------------------------------------------------------------

DR SAMPSON MARTE ROUNDED NOT DR CARLSON

## 2020-07-17 NOTE — NUR
cm note

spoke to pt states resides athome with daughter , uses walker for ambulation. discussed snf 
level of care as orderd by md , pt states does not want to go to snf level, but will think 
about it and let cm know tomorrow. but is ok for me to speak to  her daughter. Leny ETIENNE, call made to listed numbers [phone] and [phone], but no answer. will continue 
to followup.

## 2020-07-17 NOTE — NUR
I HAVE TRIED TO SPEAK TO DAUGHTER AND INFORM HER OF DC PLAN. SHE WAS UNAVAILABLE. I BRIGHTFT 
WILLIAM FOR HER TO CALL ME BACK

## 2020-07-18 VITALS — DIASTOLIC BLOOD PRESSURE: 63 MMHG | SYSTOLIC BLOOD PRESSURE: 107 MMHG

## 2020-07-18 VITALS — DIASTOLIC BLOOD PRESSURE: 50 MMHG | SYSTOLIC BLOOD PRESSURE: 108 MMHG

## 2020-07-18 VITALS — SYSTOLIC BLOOD PRESSURE: 119 MMHG | DIASTOLIC BLOOD PRESSURE: 64 MMHG

## 2020-07-18 VITALS — SYSTOLIC BLOOD PRESSURE: 111 MMHG | DIASTOLIC BLOOD PRESSURE: 48 MMHG

## 2020-07-18 VITALS — SYSTOLIC BLOOD PRESSURE: 144 MMHG | DIASTOLIC BLOOD PRESSURE: 64 MMHG

## 2020-07-18 RX ADMIN — LISINOPRIL SCH MG: 5 TABLET ORAL at 08:50

## 2020-07-18 RX ADMIN — ENOXAPARIN SODIUM SCH MG: 30 INJECTION SUBCUTANEOUS at 08:50

## 2020-07-18 RX ADMIN — SODIUM CHLORIDE SCH UNIT: 9 INJECTION, SOLUTION INTRAVENOUS at 16:43

## 2020-07-18 RX ADMIN — FLUCONAZOLE SCH MG: 100 TABLET ORAL at 08:50

## 2020-07-18 RX ADMIN — SODIUM CHLORIDE SCH UNIT: 9 INJECTION, SOLUTION INTRAVENOUS at 10:57

## 2020-07-18 RX ADMIN — LEVOFLOXACIN SCH MLS/HR: 5 INJECTION, SOLUTION INTRAVENOUS at 13:31

## 2020-07-18 RX ADMIN — NIFEDIPINE SCH MG: 30 TABLET, FILM COATED, EXTENDED RELEASE ORAL at 20:52

## 2020-07-18 RX ADMIN — SODIUM CHLORIDE SCH UNIT: 9 INJECTION, SOLUTION INTRAVENOUS at 20:52

## 2020-07-18 RX ADMIN — NIFEDIPINE SCH MG: 30 TABLET, FILM COATED, EXTENDED RELEASE ORAL at 08:49

## 2020-07-18 RX ADMIN — NIFEDIPINE SCH MG: 30 TABLET, FILM COATED, EXTENDED RELEASE ORAL at 21:00

## 2020-07-18 RX ADMIN — SODIUM CHLORIDE SCH UNIT: 9 INJECTION, SOLUTION INTRAVENOUS at 06:24

## 2020-07-18 NOTE — NUR
cm note

 spoke to pt regarding snf level of care for rehab. but, states she does not wish to go to a 
snf. i informed her that I left message for daughter elaina to return back call, but no 
answer yet. will continue to f/u. informed primary nurse Benito to obtain a phone # that she 
can be reached at. for possible rehab.

## 2020-07-18 NOTE — NUR
NURSING ROUNDS

Pt resting in bed on rounds, comfortably lying with the head of the bed flat. Stated that 
she starts having discomfort from her lower back every time that somebody gets the head of 
the bed up. Denies any discomfort, voids per bedpan. Pt on contact isolation for esbl in the 
urine. Made aware about the discharge planning to a snf and strongly disagree with the plan. 
Insists on being discharge to home, made aware that we will be talking to the daughter in 
the am.

## 2020-07-19 VITALS — SYSTOLIC BLOOD PRESSURE: 125 MMHG | DIASTOLIC BLOOD PRESSURE: 64 MMHG

## 2020-07-19 VITALS — SYSTOLIC BLOOD PRESSURE: 118 MMHG | DIASTOLIC BLOOD PRESSURE: 54 MMHG

## 2020-07-19 VITALS — SYSTOLIC BLOOD PRESSURE: 132 MMHG | DIASTOLIC BLOOD PRESSURE: 49 MMHG

## 2020-07-19 VITALS — SYSTOLIC BLOOD PRESSURE: 115 MMHG | DIASTOLIC BLOOD PRESSURE: 62 MMHG

## 2020-07-19 VITALS — SYSTOLIC BLOOD PRESSURE: 109 MMHG | DIASTOLIC BLOOD PRESSURE: 58 MMHG

## 2020-07-19 VITALS — DIASTOLIC BLOOD PRESSURE: 52 MMHG | SYSTOLIC BLOOD PRESSURE: 118 MMHG

## 2020-07-19 VITALS — SYSTOLIC BLOOD PRESSURE: 136 MMHG | DIASTOLIC BLOOD PRESSURE: 57 MMHG

## 2020-07-19 LAB
ALBUMIN SERPL-MCNC: 2.8 G/DL (ref 3.5–5)
ALT SERPL-CCNC: 12 U/L (ref 12–78)
AST SERPL-CCNC: 13 U/L (ref 10–37)
BASOPHILS NFR BLD AUTO: 0.9 % (ref 0–5)
BILIRUB SERPL-MCNC: 0.3 MG/DL (ref 0.2–1)
BUN SERPL-MCNC: 29 MG/DL (ref 7–18)
CHLORIDE SERPL-SCNC: 107 MMOL/L (ref 101–111)
CO2 SERPL-SCNC: 26 MMOL/L (ref 21–32)
CREAT SERPL-MCNC: 1.1 MG/DL (ref 0.5–1.5)
EOSINOPHIL NFR BLD AUTO: 2.6 % (ref 0–8)
ERYTHROCYTE [DISTWIDTH] IN BLOOD BY AUTOMATED COUNT: 14 % (ref 11–15.5)
GFR SERPL CREATININE-BSD FRML MDRD: 52 ML/MIN (ref 60–?)
GLUCOSE SERPL-MCNC: 184 MG/DL (ref 70–105)
HCT VFR BLD AUTO: 28.6 % (ref 36–48)
LYMPHOCYTES NFR SPEC AUTO: 31.5 % (ref 21–51)
MCH RBC QN AUTO: 31 PG (ref 27–33)
MCHC RBC AUTO-ENTMCNC: 32.9 G/DL (ref 32–36)
MCV RBC AUTO: 94.4 FL (ref 79–99)
MONOCYTES NFR BLD AUTO: 7.9 % (ref 3–13)
NEUTROPHILS NFR BLD AUTO: 56.9 % (ref 40–77)
NRBC BLD MANUAL-RTO: 0 % (ref 0–0.19)
PLATELET # BLD AUTO: 202 K/UL (ref 130–400)
POTASSIUM SERPL-SCNC: 4.6 MMOL/L (ref 3.5–5.1)
PROT SERPL-MCNC: 6.5 G/DL (ref 6–8.3)
RBC # BLD AUTO: 3.03 MIL/UL (ref 4–5.5)
SODIUM SERPL-SCNC: 141 MMOL/L (ref 136–145)
WBC # BLD AUTO: 5.8 K/UL (ref 4.8–10.8)

## 2020-07-19 RX ADMIN — ENOXAPARIN SODIUM SCH MG: 30 INJECTION SUBCUTANEOUS at 09:35

## 2020-07-19 RX ADMIN — SODIUM CHLORIDE SCH UNIT: 9 INJECTION, SOLUTION INTRAVENOUS at 20:35

## 2020-07-19 RX ADMIN — LEVOFLOXACIN SCH MLS/HR: 5 INJECTION, SOLUTION INTRAVENOUS at 13:00

## 2020-07-19 RX ADMIN — FAMOTIDINE SCH MG: 20 TABLET, FILM COATED ORAL at 09:36

## 2020-07-19 RX ADMIN — SODIUM CHLORIDE SCH UNIT: 9 INJECTION, SOLUTION INTRAVENOUS at 11:24

## 2020-07-19 RX ADMIN — SODIUM CHLORIDE SCH UNIT: 9 INJECTION, SOLUTION INTRAVENOUS at 16:32

## 2020-07-19 RX ADMIN — NIFEDIPINE SCH MG: 30 TABLET, FILM COATED, EXTENDED RELEASE ORAL at 09:37

## 2020-07-19 RX ADMIN — FLUCONAZOLE SCH MG: 100 TABLET ORAL at 09:36

## 2020-07-19 RX ADMIN — LISINOPRIL SCH MG: 5 TABLET ORAL at 09:36

## 2020-07-19 RX ADMIN — NIFEDIPINE SCH MG: 30 TABLET, FILM COATED, EXTENDED RELEASE ORAL at 20:32

## 2020-07-19 RX ADMIN — SODIUM CHLORIDE SCH UNIT: 9 INJECTION, SOLUTION INTRAVENOUS at 05:50

## 2020-07-19 NOTE — NUR
cm note

met with patient and states resides athome with daughter elaina, pt states [facility], unable to recall # and discusses snf level of care for rehab and is refusing 
that at this time. call made to daughter elaina as well, pt states elaina cares for her. 
no answer to listed # [phone] and left voicemessage. also tried [phone] and no answer,

## 2020-07-19 NOTE — NUR
PATIENT UPDATE

Pt slept well overnight, no complaints voiced out. With gen body weakness, running NSR in 
the monitor in the 80's, no ectopies noted, afebrile. Blood pressure was running initially 
in the low 100's mmhg last night, held the 2100 Nifedipine 30 mg.  Voided once the whole 
night, hasn't been drinking enough water. BUN fr 24 yesterday , up to 29 today.

## 2020-07-20 VITALS — DIASTOLIC BLOOD PRESSURE: 68 MMHG | SYSTOLIC BLOOD PRESSURE: 135 MMHG

## 2020-07-20 VITALS — SYSTOLIC BLOOD PRESSURE: 106 MMHG | DIASTOLIC BLOOD PRESSURE: 49 MMHG

## 2020-07-20 VITALS — SYSTOLIC BLOOD PRESSURE: 104 MMHG | DIASTOLIC BLOOD PRESSURE: 54 MMHG

## 2020-07-20 VITALS — SYSTOLIC BLOOD PRESSURE: 102 MMHG | DIASTOLIC BLOOD PRESSURE: 40 MMHG

## 2020-07-20 VITALS — DIASTOLIC BLOOD PRESSURE: 58 MMHG | SYSTOLIC BLOOD PRESSURE: 121 MMHG

## 2020-07-20 VITALS — DIASTOLIC BLOOD PRESSURE: 50 MMHG | SYSTOLIC BLOOD PRESSURE: 107 MMHG

## 2020-07-20 LAB
ALBUMIN SERPL-MCNC: 2.8 G/DL (ref 3.5–5)
ALT SERPL-CCNC: 12 U/L (ref 12–78)
AST SERPL-CCNC: 12 U/L (ref 10–37)
BASOPHILS NFR BLD AUTO: 0.9 % (ref 0–5)
BILIRUB SERPL-MCNC: 0.3 MG/DL (ref 0.2–1)
BUN SERPL-MCNC: 32 MG/DL (ref 7–18)
CHLORIDE SERPL-SCNC: 107 MMOL/L (ref 101–111)
CO2 SERPL-SCNC: 26 MMOL/L (ref 21–32)
CREAT SERPL-MCNC: 1.2 MG/DL (ref 0.5–1.5)
EOSINOPHIL NFR BLD AUTO: 2.9 % (ref 0–8)
ERYTHROCYTE [DISTWIDTH] IN BLOOD BY AUTOMATED COUNT: 14 % (ref 11–15.5)
GFR SERPL CREATININE-BSD FRML MDRD: 47 ML/MIN (ref 60–?)
GLUCOSE SERPL-MCNC: 260 MG/DL (ref 70–105)
HCT VFR BLD AUTO: 28.4 % (ref 36–48)
LYMPHOCYTES NFR SPEC AUTO: 33.5 % (ref 21–51)
MCH RBC QN AUTO: 31 PG (ref 27–33)
MCHC RBC AUTO-ENTMCNC: 32.7 G/DL (ref 32–36)
MCV RBC AUTO: 94.7 FL (ref 79–99)
MONOCYTES NFR BLD AUTO: 7.2 % (ref 3–13)
NEUTROPHILS NFR BLD AUTO: 55.3 % (ref 40–77)
NRBC BLD MANUAL-RTO: 0 % (ref 0–0.19)
PLATELET # BLD AUTO: 198 K/UL (ref 130–400)
POTASSIUM SERPL-SCNC: 4.8 MMOL/L (ref 3.5–5.1)
PROT SERPL-MCNC: 6.3 G/DL (ref 6–8.3)
RBC # BLD AUTO: 3 MIL/UL (ref 4–5.5)
SODIUM SERPL-SCNC: 141 MMOL/L (ref 136–145)
WBC # BLD AUTO: 5.6 K/UL (ref 4.8–10.8)

## 2020-07-20 RX ADMIN — SODIUM CHLORIDE SCH UNIT: 9 INJECTION, SOLUTION INTRAVENOUS at 21:00

## 2020-07-20 RX ADMIN — NIFEDIPINE SCH MG: 30 TABLET, FILM COATED, EXTENDED RELEASE ORAL at 07:52

## 2020-07-20 RX ADMIN — SODIUM CHLORIDE SCH UNIT: 9 INJECTION, SOLUTION INTRAVENOUS at 11:12

## 2020-07-20 RX ADMIN — INSULIN LISPRO SCH UNIT: 100 INJECTION, SOLUTION INTRAVENOUS; SUBCUTANEOUS at 17:00

## 2020-07-20 RX ADMIN — ENOXAPARIN SODIUM SCH MG: 30 INJECTION SUBCUTANEOUS at 07:54

## 2020-07-20 RX ADMIN — LEVOFLOXACIN SCH MLS/HR: 5 INJECTION, SOLUTION INTRAVENOUS at 12:29

## 2020-07-20 RX ADMIN — FLUCONAZOLE SCH MG: 100 TABLET ORAL at 07:52

## 2020-07-20 RX ADMIN — NIFEDIPINE SCH MG: 30 TABLET, FILM COATED, EXTENDED RELEASE ORAL at 21:38

## 2020-07-20 RX ADMIN — INSULIN GLARGINE SCH UNITS: 100 INJECTION, SOLUTION SUBCUTANEOUS at 10:13

## 2020-07-20 RX ADMIN — SODIUM CHLORIDE SCH UNIT: 9 INJECTION, SOLUTION INTRAVENOUS at 06:34

## 2020-07-20 RX ADMIN — INSULIN LISPRO SCH UNIT: 100 INJECTION, SOLUTION INTRAVENOUS; SUBCUTANEOUS at 11:16

## 2020-07-20 RX ADMIN — LISINOPRIL SCH MG: 5 TABLET ORAL at 07:52

## 2020-07-20 RX ADMIN — INSULIN LISPRO SCH UNIT: 100 INJECTION, SOLUTION INTRAVENOUS; SUBCUTANEOUS at 11:11

## 2020-07-20 RX ADMIN — SODIUM CHLORIDE SCH UNIT: 9 INJECTION, SOLUTION INTRAVENOUS at 16:30

## 2020-07-21 VITALS — DIASTOLIC BLOOD PRESSURE: 53 MMHG | SYSTOLIC BLOOD PRESSURE: 114 MMHG

## 2020-07-21 VITALS — DIASTOLIC BLOOD PRESSURE: 46 MMHG | SYSTOLIC BLOOD PRESSURE: 98 MMHG

## 2020-07-21 VITALS — SYSTOLIC BLOOD PRESSURE: 111 MMHG | DIASTOLIC BLOOD PRESSURE: 52 MMHG

## 2020-07-21 VITALS — SYSTOLIC BLOOD PRESSURE: 114 MMHG | DIASTOLIC BLOOD PRESSURE: 53 MMHG

## 2020-07-21 LAB
ALBUMIN SERPL-MCNC: 2.8 G/DL (ref 3.5–5)
ALT SERPL-CCNC: 13 U/L (ref 12–78)
AST SERPL-CCNC: 8 U/L (ref 10–37)
BASOPHILS NFR BLD AUTO: 0.5 % (ref 0–5)
BILIRUB SERPL-MCNC: 0.2 MG/DL (ref 0.2–1)
BUN SERPL-MCNC: 38 MG/DL (ref 7–18)
CHLORIDE SERPL-SCNC: 106 MMOL/L (ref 101–111)
CO2 SERPL-SCNC: 24 MMOL/L (ref 21–32)
CREAT SERPL-MCNC: 1.6 MG/DL (ref 0.5–1.5)
EOSINOPHIL NFR BLD AUTO: 2.9 % (ref 0–8)
ERYTHROCYTE [DISTWIDTH] IN BLOOD BY AUTOMATED COUNT: 14 % (ref 11–15.5)
GFR SERPL CREATININE-BSD FRML MDRD: 34 ML/MIN (ref 60–?)
GLUCOSE SERPL-MCNC: 175 MG/DL (ref 70–105)
HCT VFR BLD AUTO: 27.1 % (ref 36–48)
LYMPHOCYTES NFR SPEC AUTO: 34 % (ref 21–51)
MCH RBC QN AUTO: 30.2 PG (ref 27–33)
MCHC RBC AUTO-ENTMCNC: 32.5 G/DL (ref 32–36)
MCV RBC AUTO: 93.1 FL (ref 79–99)
MONOCYTES NFR BLD AUTO: 7.6 % (ref 3–13)
NEUTROPHILS NFR BLD AUTO: 54.8 % (ref 40–77)
NRBC BLD MANUAL-RTO: 0 % (ref 0–0.19)
PLATELET # BLD AUTO: 200 K/UL (ref 130–400)
POTASSIUM SERPL-SCNC: 3.9 MMOL/L (ref 3.5–5.1)
PROT SERPL-MCNC: 6.3 G/DL (ref 6–8.3)
RBC # BLD AUTO: 2.91 MIL/UL (ref 4–5.5)
SODIUM SERPL-SCNC: 141 MMOL/L (ref 136–145)
WBC # BLD AUTO: 5.6 K/UL (ref 4.8–10.8)

## 2020-07-21 RX ADMIN — INSULIN LISPRO SCH UNIT: 100 INJECTION, SOLUTION INTRAVENOUS; SUBCUTANEOUS at 12:00

## 2020-07-21 RX ADMIN — FAMOTIDINE SCH MG: 20 TABLET, FILM COATED ORAL at 09:32

## 2020-07-21 RX ADMIN — NIFEDIPINE SCH MG: 30 TABLET, FILM COATED, EXTENDED RELEASE ORAL at 09:32

## 2020-07-21 RX ADMIN — INSULIN LISPRO SCH UNIT: 100 INJECTION, SOLUTION INTRAVENOUS; SUBCUTANEOUS at 08:00

## 2020-07-21 RX ADMIN — LEVOFLOXACIN SCH MLS/HR: 5 INJECTION, SOLUTION INTRAVENOUS at 13:00

## 2020-07-21 RX ADMIN — SODIUM CHLORIDE SCH UNIT: 9 INJECTION, SOLUTION INTRAVENOUS at 06:04

## 2020-07-21 RX ADMIN — LISINOPRIL SCH MG: 5 TABLET ORAL at 09:32

## 2020-07-21 RX ADMIN — INSULIN LISPRO SCH UNIT: 100 INJECTION, SOLUTION INTRAVENOUS; SUBCUTANEOUS at 17:00

## 2020-07-21 RX ADMIN — FLUCONAZOLE SCH MG: 100 TABLET ORAL at 09:32

## 2020-07-21 RX ADMIN — ENOXAPARIN SODIUM SCH MG: 30 INJECTION SUBCUTANEOUS at 09:00

## 2020-07-21 RX ADMIN — INSULIN GLARGINE SCH UNITS: 100 INJECTION, SOLUTION SUBCUTANEOUS at 09:00

## 2020-07-21 RX ADMIN — SODIUM CHLORIDE SCH UNIT: 9 INJECTION, SOLUTION INTRAVENOUS at 16:30

## 2020-07-21 RX ADMIN — SODIUM CHLORIDE SCH UNIT: 9 INJECTION, SOLUTION INTRAVENOUS at 11:30

## 2020-07-21 NOTE — NUR
ASSESSMENT

ENCOUNTERED PT ASLEEP BUT AROUSEABLE, A&OX3, CALM COOPERATIVE AND DOES NOT APPEAR TO BE IN 
ANY DISTRESS NOR ANY NEURO DEFICITS PRESENT. PT DENIES PAIN, SOB, NAUSEA. PT IS ABLE TO 
TOLERATE FOODS, FLUIDS AND MEDICATION WITH NO THROAT CLEARING OR COUGH. PT IS AMBULATORY, 
GAIT SLOW AND UNSTEADY WITH 1-2 PERSON ASSIST. CALL LIGHT WITHIN REACH.

## 2020-07-21 NOTE — NUR
DISCHARGE

INSTRUCTIONS GIVEN, PIV REMOVED AND INTACT, SPOKE WITH DAUGHTER DELBERT ADAM, DISCHARGED 
HOME TO FAMILY VEHICLE VIA WHEELCHAIR.

## 2023-07-18 NOTE — NUR
ATTEMPTED REPORT CALLED TO [facility] 



THE WRITER ATTEMPTED X2 TO CALL REPORT FOR PT D/C TO [facility] BUT NO RESPONSE AT THIS TIME, 
NURSING WILL CONTINUE TO FOLLOW UP. Never